# Patient Record
Sex: MALE | Race: WHITE | NOT HISPANIC OR LATINO | Employment: FULL TIME | URBAN - METROPOLITAN AREA
[De-identification: names, ages, dates, MRNs, and addresses within clinical notes are randomized per-mention and may not be internally consistent; named-entity substitution may affect disease eponyms.]

---

## 2020-09-10 ENCOUNTER — OFFICE VISIT (OUTPATIENT)
Dept: FAMILY MEDICINE | Facility: CLINIC | Age: 40
End: 2020-09-10

## 2020-09-10 VITALS
SYSTOLIC BLOOD PRESSURE: 130 MMHG | RESPIRATION RATE: 16 BRPM | HEART RATE: 90 BPM | DIASTOLIC BLOOD PRESSURE: 76 MMHG | BODY MASS INDEX: 28 KG/M2 | WEIGHT: 200 LBS | OXYGEN SATURATION: 97 % | TEMPERATURE: 98.1 F | HEIGHT: 71 IN

## 2020-09-10 DIAGNOSIS — B07.8 OTHER VIRAL WARTS: Primary | ICD-10-CM

## 2020-09-10 DIAGNOSIS — L84 CORN OR CALLUS: ICD-10-CM

## 2020-09-10 PROCEDURE — 17110 DESTRUCTION B9 LES UP TO 14: CPT | Performed by: NURSE PRACTITIONER

## 2020-09-10 PROCEDURE — 99202 OFFICE O/P NEW SF 15 MIN: CPT | Mod: 25 | Performed by: NURSE PRACTITIONER

## 2020-09-10 ASSESSMENT — MIFFLIN-ST. JEOR: SCORE: 1844.32

## 2020-09-10 NOTE — PROGRESS NOTES
"    Problem(s) Oriented visit        SUBJECTIVE:                                                    Sonny Deluca is a 40 year old male who presents to clinic today for the following health issues :    Two warts- one to the bottom of his R foot present x10 years, occasionally bothersome with walking. One to his left calf that is getting a little bigger over time, present for the past year. No OTC treatments at home, would like these removed. Not immunocompromised, not on blood thinners.      Problem list, Medication list, Allergies, and Medical/Social/Surgical histories reviewed in [facility] and updated as appropriate.   Additional history: as documented    ROS:  Gen, skin negative except as listed per HPI    Histories:   Patient Active Problem List   Diagnosis     Health Care Home     Past Surgical History:   Procedure Laterality Date     NO HISTORY OF SURGERY         Social History     Tobacco Use     Smoking status: Never Smoker     Smokeless tobacco: Never Used   Substance Use Topics     Alcohol use: No     Family History   Problem Relation Age of Onset     Alcohol/Drug Father      Depression Mother         medicated     Cancer Paternal Grandfather         stomach     Family History Negative Sister          Current Outpatient Medications   Medication Sig Dispense Refill     calcium carbonate (TUMS) 500 MG chewable tablet Take 1 chew tab by mouth as needed        cetirizine (ZYRTEC) 10 MG tablet Take 10 mg by mouth daily         OBJECTIVE:                                                    /76   Pulse 90   Temp 98.1  F (36.7  C) (Skin)   Resp 16   Ht 1.803 m (5' 11\")   Wt 90.7 kg (200 lb)   SpO2 97%   BMI 27.89 kg/m    Body mass index is 27.89 kg/m .     The patient appears today in no apparent distress.  Vitals as above.  Skin: A non-erythematous, raised verrucous appearing papule with pinpoint hemmorhages measuring roughly 8mm is seen on the left medial calf. Calloused appearing lesion with a central " core present to the pad of the R foot.    PROCEDURE:  Debridement of corn  Debridement and cryotherapy of wart    After discussion of risks, benefits, indications, and treatment options, Sonny elects for debridement and cryotherapy. He gave verbal consent for treatment.    #1) Corn: Lesion debrided with #15 blade and central core removed. Bandage applied. He tolerated this well.    #2) Wart: Lesion debrided with #15 blade and was treated with liquid nitrogen in a 30 second freeze/60 second thaw cycle with a 2mm margin. Bandage applied, tolerated well.       ASSESSMENT/PLAN:                                                        Sonny was seen today for wart.    Diagnoses and all orders for this visit:    Other viral warts  -     DESTRUCT BENIGN LESION, UP TO 14    Discussed viral nature of warts, treatment with cryotherapy and Compound W. To apply Compound W nightly and occlude with a bandage, return in two weeks for another round of cryotherapy. Reviewed after care, what to expect, and when to call.    Carlin or callus    Tolerated debridement well, reviewed the pathophysiology of corns. Recommend trying a moleskin pad and having good supportive footwear. Reviewed after care, what to expect, and when to call.    Patient needs assistance with ADLs: none identified today  Patient needs assistance with iADLs: none identified today    The following health maintenance items are reviewed in Epic and correct as of today:  Health Maintenance   Topic Date Due     HIV SCREENING  05/27/1996     PREVENTIVE CARE VISIT  08/21/2014     LIPID  08/21/2018     PHQ-2  01/01/2020     INFLUENZA VACCINE (1) 09/01/2020     DTAP/TDAP/TD IMMUNIZATION (2 - Td) 11/21/2022     IPV IMMUNIZATION  Aged Out     MENINGITIS IMMUNIZATION  Aged Out     HEPATITIS B IMMUNIZATION  Aged Out       Patient Instructions     Patient Education     Treating Corns and Calluses  If your corns or calluses are mild, reducing friction may help. Different shoes,  moleskin patches, or soft pads may be all the treatment you need. In more severe cases, treating tissue buildup may require your doctor s care. Sometimes custom-made shoe inserts (orthotics) or special pads are prescribed to reduce friction and pressure.     Doctor examining senior man's foot.   Change shoes  If you have corns, your doctor may suggest wearing shoes that have more toe room. This way, buckled joints are less likely to be pinched against the top of the shoe. If you have calluses, wearing a cushioned insole, arch support, or heel counter can help reduce friction.  Visit your doctor  In some cases, your doctor may trim away the outer layers of skin that make up the corn or callus. For a painful corn, medicine may be injected beneath the built-up tissue.  Wear orthotics  Orthotics are specially made to meet the needs of your feet. They cushion calluses or divert pressure away from these problem areas. Worn as directed, orthotics help limit existing problems and prevent new ones from forming.  If you need surgery  If a bone or joint is out of place, certain parts of your foot may be under too much pressure. This can cause severe corns and calluses. In such cases, surgery may be the best way to correct the problem.  Outpatient procedures  In most cases, surgery to improve bone position is an outpatient procedure. Your doctor may cut away excess bone, reposition prominent bones, or even fuse joints. Sometimes tendons or ligaments are cut to reduce tension on a bone or joint. Your doctor will talk with you about the procedure that is best suited to your needs.  Date Last Reviewed: 7/1/2016 2000-2019 [employer]. [address] [place] [address], [place] [address]. All rights reserved. This information is not intended as a substitute for professional medical care. Always follow your healthcare professional's instructions.         Patient Education     Treating Warts     You and your healthcare provider  can discuss whether your warts need to be treated.     You and your healthcare provider can talk about what treatment may be best for your wart or warts. To get rid of your warts, your healthcare provider may need to try more than one type of treatment. The methods described below are often used to treat warts.  Types of treatment    Do nothing. Most warts will resolve within 2 years, even without treatment. So doing nothing is sometimes a good option. This is particularly true for smaller warts that are not causing symptoms.    Cryotherapy (liquid nitrogen). This kills skin cells by freezing them. It kills the warts and destroys skin infected by the wart-causing virus. This is done in your healthcare provider s office and will cause some discomfort. It may take several treatments over several weeks to get rid of the warts.    Topical medicines. Prescribed topical medicines can be put on the skin. These are usually applied in the healthcare provider's office. But some prescriptions may be applied at home.    Over-the-counter (OTC) topical treatments. OTC medicines that most often contain salicylic acid may be an option. These patches, liquids, and creams are used at home. The medicine is applied daily to the wart and nearby skin. It's usually left on overnight. The dead skin is filed down the next day. In 1 to 3 days, the procedure can be repeated. Topical treatments are sometimes combined with cryotherapy.    Electrodessication and curettage (ED & C).  For this procedure, the healthcare provider applies numbing medicine to the wart. Then the wart is scraped or cut off. This type of treatment is usually not the first line of therapy.    Laser surgery.  This can vaporize wart tissue or destroy the blood vessels that feed the wart. This is done in the healthcare provider's office.    Shots (injections). These can be used to treat warts that don t respond to other treatments, such as stubborn or painful warts around  the nails. This is done in the healthcare provider s office.    When to seek medical treatment  It s a good idea to have your healthcare provider check your warts. That way your provider can rule out any other skin problems. Sometimes a callous or a corn can look like a wart, but the treatments may differ. Treatment can also provide relief from warts that bleed, burn, hurt, or itch. Genital warts should always be treated. They can spread to other people through sexual contact. And they may cause genital or cervical cancer.  After having your warts treated, new warts may still appear. Don t be discouraged. Warts often come back. See your healthcare provider again to discuss this. Your provider can tell you about the treatments that most likely will help clear your skin of warts.  Date Last Reviewed: 2/1/2017 2000-2019 [employer]. [address] [place] [address]. All rights reserved. This information is not intended as a substitute for professional medical care. Always follow your healthcare professional's instructions.         Use compound W on the wart nightly, come back in two weeks for another round of cryotherapy. Keep the area clean, dry, and covered.      AUDIE Araiza CNP  [facility]  Family Practice  [facility]  [phone]    For any issues my office # is [phone]

## 2020-09-10 NOTE — PATIENT INSTRUCTIONS
Patient Education     Treating Corns and Calluses  If your corns or calluses are mild, reducing friction may help. Different shoes, moleskin patches, or soft pads may be all the treatment you need. In more severe cases, treating tissue buildup may require your doctor s care. Sometimes custom-made shoe inserts (orthotics) or special pads are prescribed to reduce friction and pressure.     Doctor examining senior man's foot.   Change shoes  If you have corns, your doctor may suggest wearing shoes that have more toe room. This way, buckled joints are less likely to be pinched against the top of the shoe. If you have calluses, wearing a cushioned insole, arch support, or heel counter can help reduce friction.  Visit your doctor  In some cases, your doctor may trim away the outer layers of skin that make up the corn or callus. For a painful corn, medicine may be injected beneath the built-up tissue.  Wear orthotics  Orthotics are specially made to meet the needs of your feet. They cushion calluses or divert pressure away from these problem areas. Worn as directed, orthotics help limit existing problems and prevent new ones from forming.  If you need surgery  If a bone or joint is out of place, certain parts of your foot may be under too much pressure. This can cause severe corns and calluses. In such cases, surgery may be the best way to correct the problem.  Outpatient procedures  In most cases, surgery to improve bone position is an outpatient procedure. Your doctor may cut away excess bone, reposition prominent bones, or even fuse joints. Sometimes tendons or ligaments are cut to reduce tension on a bone or joint. Your doctor will talk with you about the procedure that is best suited to your needs.  Date Last Reviewed: 7/1/2016 2000-2019 [employer]. [address] [place] [address], [place] [address]. All rights reserved. This information is not intended as a substitute for professional medical care. Always  follow your healthcare professional's instructions.         Patient Education     Treating Warts     You and your healthcare provider can discuss whether your warts need to be treated.     You and your healthcare provider can talk about what treatment may be best for your wart or warts. To get rid of your warts, your healthcare provider may need to try more than one type of treatment. The methods described below are often used to treat warts.  Types of treatment    Do nothing. Most warts will resolve within 2 years, even without treatment. So doing nothing is sometimes a good option. This is particularly true for smaller warts that are not causing symptoms.    Cryotherapy (liquid nitrogen). This kills skin cells by freezing them. It kills the warts and destroys skin infected by the wart-causing virus. This is done in your healthcare provider s office and will cause some discomfort. It may take several treatments over several weeks to get rid of the warts.    Topical medicines. Prescribed topical medicines can be put on the skin. These are usually applied in the healthcare provider's office. But some prescriptions may be applied at home.    Over-the-counter (OTC) topical treatments. OTC medicines that most often contain salicylic acid may be an option. These patches, liquids, and creams are used at home. The medicine is applied daily to the wart and nearby skin. It's usually left on overnight. The dead skin is filed down the next day. In 1 to 3 days, the procedure can be repeated. Topical treatments are sometimes combined with cryotherapy.    Electrodessication and curettage (ED & C).  For this procedure, the healthcare provider applies numbing medicine to the wart. Then the wart is scraped or cut off. This type of treatment is usually not the first line of therapy.    Laser surgery.  This can vaporize wart tissue or destroy the blood vessels that feed the wart. This is done in the healthcare provider's  office.    Shots (injections). These can be used to treat warts that don t respond to other treatments, such as stubborn or painful warts around the nails. This is done in the healthcare provider s office.    When to seek medical treatment  It s a good idea to have your healthcare provider check your warts. That way your provider can rule out any other skin problems. Sometimes a callous or a corn can look like a wart, but the treatments may differ. Treatment can also provide relief from warts that bleed, burn, hurt, or itch. Genital warts should always be treated. They can spread to other people through sexual contact. And they may cause genital or cervical cancer.  After having your warts treated, new warts may still appear. Don t be discouraged. Warts often come back. See your healthcare provider again to discuss this. Your provider can tell you about the treatments that most likely will help clear your skin of warts.  Date Last Reviewed: 2/1/2017 2000-2019 [employer]. [address] [place] [address], [place] [address]. All rights reserved. This information is not intended as a substitute for professional medical care. Always follow your healthcare professional's instructions.         Use compound W on the wart nightly, come back in two weeks for another round of cryotherapy. Keep the area clean, dry, and covered.

## 2020-09-24 ENCOUNTER — OFFICE VISIT (OUTPATIENT)
Dept: FAMILY MEDICINE | Facility: CLINIC | Age: 40
End: 2020-09-24

## 2020-09-24 VITALS
SYSTOLIC BLOOD PRESSURE: 112 MMHG | OXYGEN SATURATION: 97 % | HEART RATE: 94 BPM | TEMPERATURE: 98.1 F | BODY MASS INDEX: 27.7 KG/M2 | WEIGHT: 198.6 LBS | DIASTOLIC BLOOD PRESSURE: 70 MMHG

## 2020-09-24 DIAGNOSIS — B07.9 VIRAL WARTS, UNSPECIFIED TYPE: Primary | ICD-10-CM

## 2020-09-24 PROCEDURE — 99207 ZZC NO CHARGE LOS: CPT | Performed by: NURSE PRACTITIONER

## 2020-09-24 NOTE — PROGRESS NOTES
Problem(s) Oriented visit        SUBJECTIVE:                                                    Sonny Deluca is a 40 year old male who presents to clinic today for the following health issues :    Cryotherapy to wart two weeks ago to his left medial calf- presents today for second round of cryotherapy. However, he has concerns that his lesion has not yet healed and expresses frustration with the healing process. He is applying Compound W nightly and applying a bandage. He is concerned that his lesion will leave a big scar and that his cryotherapy treatment was much larger than necessary. He reports the area blistered, which he was prepared for, but then bled after the blister drained.      OBJECTIVE:                                                    Physical Exam:    /70   Pulse 94   Temp 98.1  F (36.7  C)   Wt 90.1 kg (198 lb 9.6 oz)   SpO2 97%   BMI 27.70 kg/m      CONSTITUTIONAL: Alert non-toxic appearing male in no acute distress  SKIN: L medial calf with roughly 1cm flat lesion, mildly erythematous borders, base with granulating tissue. Does appear slightly macerated.  PSYCHIATRIC: Interactive, affect euthymic, makes appropriate eye contact, thought process logical       ASSESSMENT/PLAN:                                                        Sonny was seen today for recheck.    Diagnoses and all orders for this visit:    Viral warts, unspecified type      Lesion treated does not appear to have healed yet at this point- does not appear infected, but appears macerated. Reviewed that a 2mm margin was obtained and the lesion seems consistent with this- he is concerned that he will have scarring. Discussed that he may have some scarring, but that his lesion has not yet healed. He does seem frustrated with this. Since lesion appears macerated, to stop Compound W. Keep area clean and dry. Allow the lesion to heal over the next 1-2 weeks, and if he still has evidence of remaining wart, can retrial  Compound W vs cryotherapy depending on his preference and tolerance. I have encouraged him to send me pictures of his progress via [employer].     Given that this was to a be a procedure visit and procedure was not performed, there will be no charge for this visit.     AUDIE Araiza CNP  [facility]  [phone]    For any issues my office # is [phone]

## 2020-09-24 NOTE — PATIENT INSTRUCTIONS
Stop compound W and use a bandage over the site, change daily- if still present in two weeks, send Tonya a message

## 2023-02-13 ENCOUNTER — HOSPITAL ENCOUNTER (EMERGENCY)
Facility: CLINIC | Age: 43
Discharge: LEFT BEFORE DISCHARGE | End: 2023-02-13
Payer: COMMERCIAL

## 2023-02-13 ENCOUNTER — OFFICE VISIT (OUTPATIENT)
Dept: URGENT CARE | Facility: URGENT CARE | Age: 43
End: 2023-02-13
Payer: COMMERCIAL

## 2023-02-13 VITALS
TEMPERATURE: 98.1 F | HEART RATE: 93 BPM | WEIGHT: 222 LBS | DIASTOLIC BLOOD PRESSURE: 74 MMHG | SYSTOLIC BLOOD PRESSURE: 114 MMHG | RESPIRATION RATE: 16 BRPM | BODY MASS INDEX: 30.96 KG/M2 | OXYGEN SATURATION: 97 %

## 2023-02-13 DIAGNOSIS — R10.32 ABDOMINAL PAIN, LEFT LOWER QUADRANT: Primary | ICD-10-CM

## 2023-02-13 LAB
ALBUMIN UR-MCNC: 100 MG/DL
AMORPH CRY #/AREA URNS HPF: ABNORMAL /HPF
APPEARANCE UR: CLEAR
BACTERIA #/AREA URNS HPF: ABNORMAL /HPF
BASOPHILS # BLD AUTO: 0 10E3/UL (ref 0–0.2)
BASOPHILS NFR BLD AUTO: 0 %
BILIRUB UR QL STRIP: ABNORMAL
COLOR UR AUTO: ABNORMAL
EOSINOPHIL # BLD AUTO: 0.1 10E3/UL (ref 0–0.7)
EOSINOPHIL NFR BLD AUTO: 1 %
ERYTHROCYTE [DISTWIDTH] IN BLOOD BY AUTOMATED COUNT: 12.8 % (ref 10–15)
GLUCOSE UR STRIP-MCNC: NEGATIVE MG/DL
HCT VFR BLD AUTO: 42.9 % (ref 40–53)
HGB BLD-MCNC: 14.8 G/DL (ref 13.3–17.7)
HGB UR QL STRIP: NEGATIVE
IMM GRANULOCYTES # BLD: 0 10E3/UL
IMM GRANULOCYTES NFR BLD: 0 %
KETONES UR STRIP-MCNC: 15 MG/DL
LEUKOCYTE ESTERASE UR QL STRIP: NEGATIVE
LYMPHOCYTES # BLD AUTO: 1.3 10E3/UL (ref 0.8–5.3)
LYMPHOCYTES NFR BLD AUTO: 13 %
MCH RBC QN AUTO: 29.4 PG (ref 26.5–33)
MCHC RBC AUTO-ENTMCNC: 34.5 G/DL (ref 31.5–36.5)
MCV RBC AUTO: 85 FL (ref 78–100)
MONOCYTES # BLD AUTO: 0.8 10E3/UL (ref 0–1.3)
MONOCYTES NFR BLD AUTO: 8 %
MUCOUS THREADS #/AREA URNS LPF: PRESENT /LPF
NEUTROPHILS # BLD AUTO: 7.8 10E3/UL (ref 1.6–8.3)
NEUTROPHILS NFR BLD AUTO: 78 %
NITRATE UR QL: POSITIVE
PH UR STRIP: 5.5 [PH] (ref 5–7)
PLATELET # BLD AUTO: 140 10E3/UL (ref 150–450)
RBC # BLD AUTO: 5.03 10E6/UL (ref 4.4–5.9)
RBC #/AREA URNS AUTO: ABNORMAL /HPF
SP GR UR STRIP: >=1.03 (ref 1–1.03)
SQUAMOUS #/AREA URNS AUTO: ABNORMAL /LPF
UROBILINOGEN UR STRIP-ACNC: 1 E.U./DL
WBC # BLD AUTO: 10 10E3/UL (ref 4–11)
WBC #/AREA URNS AUTO: ABNORMAL /HPF

## 2023-02-13 PROCEDURE — 36415 COLL VENOUS BLD VENIPUNCTURE: CPT

## 2023-02-13 PROCEDURE — 99214 OFFICE O/P EST MOD 30 MIN: CPT

## 2023-02-13 PROCEDURE — 87086 URINE CULTURE/COLONY COUNT: CPT | Performed by: FAMILY MEDICINE

## 2023-02-13 PROCEDURE — 85025 COMPLETE CBC W/AUTO DIFF WBC: CPT

## 2023-02-13 PROCEDURE — 81001 URINALYSIS AUTO W/SCOPE: CPT

## 2023-02-13 ASSESSMENT — ACTIVITIES OF DAILY LIVING (ADL): ADLS_ACUITY_SCORE: 33

## 2023-02-13 NOTE — ED NOTES
Patient called in triage x3 at 1453, 1458, and 1503. No answer, patient did not inform staff he was leaving.

## 2023-02-13 NOTE — PROGRESS NOTES
Assessment & Plan     Abdominal pain, left lower quadrant  - UA with Microscopic reflex to Culture - Clinic Collect  - appears contaminated  - CBC with platelets and differential - unremarkable with WBC 10.0.  - Urine Microscopic Exam  - Urine Culture  - Concern for diverticulitis in setting of marked LLQ tenderness on exam and fevers - referred to ED for CT.     Ade Almanza [facility]-FNP Student  Lynne Ring MD   Urgent Care Provider  [facility] CARMELINA Dennis is a 42 year old presenting for the following health issues:  Urgent Care and Abdominal Pain (Per patient has been having LLQ abdominal pain and fever for the past 24hrs. Patient has been taking ibuprofen for symptoms )      HPI   Without significant past medical history. Reports remote history of kidney stone. No hx of abdominal surgeries. Presents with 36 - 48 hours fevers/chills - tmax 100.3 Saturday night, feeling generally unwell, sharp pain LLQ. Note slight dry cough. No BRBPR or dark tarry stools. Denies N/V/D/C. Taking ibuprofen for pain - has taken maybe 3-4 doses in the past 48 hours - this does help. Not on any medications regularly.       Review of Systems   Constitutional, HEENT, cardiovascular, pulmonary, gi and gu systems are negative, except as otherwise noted.      Objective    /74   Pulse 93   Temp 98.1  F (36.7  C) (Tympanic)   Resp 16   Wt 100.7 kg (222 lb)   SpO2 97%   BMI 30.96 kg/m    Body mass index is 30.96 kg/m .  Physical Exam   GENERAL: healthy, alert and no distress  EYES: Eyes grossly normal to inspection, PERRL and conjunctivae and sclerae normal  NECK: no adenopathy, no asymmetry, masses, or scars and thyroid normal to palpation  RESP: lungs clear to auscultation - no rales, rhonchi or wheezes  CV: regular rate and rhythm, normal S1 S2, no S3 or S4, no murmur, click or rub, no peripheral edema and peripheral pulses strong  ABDOMEN: tenderness upon palpation and rebound  tenderness LLQ, bowel sounds normal, no palpable or pulsatile masses and no scars, striae, dilated veins, rashes, or lesions.  SKIN: no suspicious lesions or rashes  NEURO: Normal strength and tone, mentation intact and speech normal  PSYCH: mentation appears normal, affect normal/bright    Results for orders placed or performed in visit on 02/13/23 (from the past 24 hour(s))   UA with Microscopic reflex to Culture - Clinic Collect    Specimen: Urine, Midstream   Result Value Ref Range    Color Urine Red (A) Colorless, Straw, Light Yellow, Yellow    Appearance Urine Clear Clear    Glucose Urine Negative Negative mg/dL    Bilirubin Urine Moderate (A) Negative    Ketones Urine 15 (A) Negative mg/dL    Specific Gravity Urine >=1.030 1.003 - 1.035    Blood Urine Negative Negative    pH Urine 5.5 5.0 - 7.0    Protein Albumin Urine 100 (A) Negative mg/dL    Urobilinogen Urine 1.0 0.2, 1.0 E.U./dL    Nitrite Urine Positive (A) Negative    Leukocyte Esterase Urine Negative Negative   CBC with platelets and differential    Narrative    The following orders were created for panel order CBC with platelets and differential.  Procedure                               Abnormality         Status                     ---------                               -----------         ------                     CBC with platelets and d...[[id number]]  Abnormal            Final result                 Please view results for these tests on the individual orders.   CBC with platelets and differential   Result Value Ref Range    WBC Count 10.0 4.0 - 11.0 10e3/uL    RBC Count 5.03 4.40 - 5.90 10e6/uL    Hemoglobin 14.8 13.3 - 17.7 g/dL    Hematocrit 42.9 40.0 - 53.0 %    MCV 85 78 - 100 fL    MCH 29.4 26.5 - 33.0 pg    MCHC 34.5 31.5 - 36.5 g/dL    RDW 12.8 10.0 - 15.0 %    Platelet Count 140 (L) 150 - 450 10e3/uL    % Neutrophils 78 %    % Lymphocytes 13 %    % Monocytes 8 %    % Eosinophils 1 %    % Basophils 0 %    % Immature Granulocytes 0 %     Absolute Neutrophils 7.8 1.6 - 8.3 10e3/uL    Absolute Lymphocytes 1.3 0.8 - 5.3 10e3/uL    Absolute Monocytes 0.8 0.0 - 1.3 10e3/uL    Absolute Eosinophils 0.1 0.0 - 0.7 10e3/uL    Absolute Basophils 0.0 0.0 - 0.2 10e3/uL    Absolute Immature Granulocytes 0.0 <=0.4 10e3/uL   Urine Microscopic Exam   Result Value Ref Range    Bacteria Urine Few (A) None Seen /HPF    RBC Urine 0-2 0-2 /HPF /HPF    WBC Urine 0-5 0-5 /HPF /HPF    Squamous Epithelials Urine Few (A) None Seen /LPF    Mucus Urine Present (A) None Seen /LPF    Amorphous Crystals Urine Few (A) None Seen /HPF

## 2023-02-14 ENCOUNTER — APPOINTMENT (OUTPATIENT)
Dept: CT IMAGING | Facility: CLINIC | Age: 43
DRG: 391 | End: 2023-02-14
Attending: EMERGENCY MEDICINE
Payer: COMMERCIAL

## 2023-02-14 ENCOUNTER — APPOINTMENT (OUTPATIENT)
Dept: GENERAL RADIOLOGY | Facility: CLINIC | Age: 43
DRG: 391 | End: 2023-02-14
Attending: INTERNAL MEDICINE
Payer: COMMERCIAL

## 2023-02-14 ENCOUNTER — APPOINTMENT (OUTPATIENT)
Dept: MRI IMAGING | Facility: CLINIC | Age: 43
DRG: 391 | End: 2023-02-14
Attending: INTERNAL MEDICINE
Payer: COMMERCIAL

## 2023-02-14 ENCOUNTER — HOSPITAL ENCOUNTER (INPATIENT)
Facility: CLINIC | Age: 43
LOS: 2 days | Discharge: HOME | DRG: 391 | End: 2023-02-16
Attending: EMERGENCY MEDICINE | Admitting: INTERNAL MEDICINE
Payer: COMMERCIAL

## 2023-02-14 DIAGNOSIS — K57.32 DIVERTICULITIS OF COLON: ICD-10-CM

## 2023-02-14 DIAGNOSIS — K63.1 COLON PERFORATION (H): ICD-10-CM

## 2023-02-14 DIAGNOSIS — Z76.89 HEALTH CARE HOME: Primary | ICD-10-CM

## 2023-02-14 LAB
ALBUMIN SERPL BCG-MCNC: 4.4 G/DL (ref 3.5–5.2)
ALP SERPL-CCNC: 100 U/L (ref 40–129)
ALT SERPL W P-5'-P-CCNC: 53 U/L (ref 10–50)
ANION GAP SERPL CALCULATED.3IONS-SCNC: 11 MMOL/L (ref 7–15)
AST SERPL W P-5'-P-CCNC: 40 U/L (ref 10–50)
BASOPHILS # BLD AUTO: 0 10E3/UL (ref 0–0.2)
BASOPHILS NFR BLD AUTO: 0 %
BILIRUB SERPL-MCNC: 1.3 MG/DL
BUN SERPL-MCNC: 13.4 MG/DL (ref 6–20)
CALCIUM SERPL-MCNC: 9.1 MG/DL (ref 8.6–10)
CHLORIDE SERPL-SCNC: 101 MMOL/L (ref 98–107)
CREAT SERPL-MCNC: 0.99 MG/DL (ref 0.67–1.17)
DEPRECATED HCO3 PLAS-SCNC: 26 MMOL/L (ref 22–29)
EOSINOPHIL # BLD AUTO: 0.1 10E3/UL (ref 0–0.7)
EOSINOPHIL NFR BLD AUTO: 2 %
ERYTHROCYTE [DISTWIDTH] IN BLOOD BY AUTOMATED COUNT: 12.7 % (ref 10–15)
FLUAV RNA SPEC QL NAA+PROBE: NEGATIVE
FLUBV RNA RESP QL NAA+PROBE: NEGATIVE
GFR SERPL CREATININE-BSD FRML MDRD: >90 ML/MIN/1.73M2
GLUCOSE SERPL-MCNC: 107 MG/DL (ref 70–99)
HCT VFR BLD AUTO: 43.5 % (ref 40–53)
HGB BLD-MCNC: 14.8 G/DL (ref 13.3–17.7)
HOLD SPECIMEN: NORMAL
IMM GRANULOCYTES # BLD: 0 10E3/UL
IMM GRANULOCYTES NFR BLD: 0 %
LIPASE SERPL-CCNC: 16 U/L (ref 13–60)
LYMPHOCYTES # BLD AUTO: 0.7 10E3/UL (ref 0.8–5.3)
LYMPHOCYTES NFR BLD AUTO: 10 %
MAGNESIUM SERPL-MCNC: 2 MG/DL (ref 1.7–2.3)
MCH RBC QN AUTO: 29 PG (ref 26.5–33)
MCHC RBC AUTO-ENTMCNC: 34 G/DL (ref 31.5–36.5)
MCV RBC AUTO: 85 FL (ref 78–100)
MONOCYTES # BLD AUTO: 0.5 10E3/UL (ref 0–1.3)
MONOCYTES NFR BLD AUTO: 8 %
NEUTROPHILS # BLD AUTO: 5.3 10E3/UL (ref 1.6–8.3)
NEUTROPHILS NFR BLD AUTO: 80 %
NRBC # BLD AUTO: 0 10E3/UL
NRBC BLD AUTO-RTO: 0 /100
PHOSPHATE SERPL-MCNC: 2.6 MG/DL (ref 2.5–4.5)
PLATELET # BLD AUTO: 141 10E3/UL (ref 150–450)
POTASSIUM SERPL-SCNC: 3.8 MMOL/L (ref 3.4–5.3)
PROT SERPL-MCNC: 7.6 G/DL (ref 6.4–8.3)
RBC # BLD AUTO: 5.1 10E6/UL (ref 4.4–5.9)
RSV RNA SPEC NAA+PROBE: NEGATIVE
SARS-COV-2 RNA RESP QL NAA+PROBE: POSITIVE
SODIUM SERPL-SCNC: 138 MMOL/L (ref 136–145)
WBC # BLD AUTO: 6.6 10E3/UL (ref 4–11)

## 2023-02-14 PROCEDURE — 84100 ASSAY OF PHOSPHORUS: CPT | Performed by: INTERNAL MEDICINE

## 2023-02-14 PROCEDURE — 80053 COMPREHEN METABOLIC PANEL: CPT | Performed by: EMERGENCY MEDICINE

## 2023-02-14 PROCEDURE — 85025 COMPLETE CBC W/AUTO DIFF WBC: CPT | Performed by: EMERGENCY MEDICINE

## 2023-02-14 PROCEDURE — 258N000003 HC RX IP 258 OP 636: Performed by: INTERNAL MEDICINE

## 2023-02-14 PROCEDURE — 96374 THER/PROPH/DIAG INJ IV PUSH: CPT

## 2023-02-14 PROCEDURE — 36415 COLL VENOUS BLD VENIPUNCTURE: CPT | Performed by: EMERGENCY MEDICINE

## 2023-02-14 PROCEDURE — XW033E5 INTRODUCTION OF REMDESIVIR ANTI-INFECTIVE INTO PERIPHERAL VEIN, PERCUTANEOUS APPROACH, NEW TECHNOLOGY GROUP 5: ICD-10-PCS | Performed by: INTERNAL MEDICINE

## 2023-02-14 PROCEDURE — 99285 EMERGENCY DEPT VISIT HI MDM: CPT | Mod: 25,CS

## 2023-02-14 PROCEDURE — 250N000013 HC RX MED GY IP 250 OP 250 PS 637: Performed by: INTERNAL MEDICINE

## 2023-02-14 PROCEDURE — 250N000011 HC RX IP 250 OP 636: Performed by: INTERNAL MEDICINE

## 2023-02-14 PROCEDURE — C9803 HOPD COVID-19 SPEC COLLECT: HCPCS

## 2023-02-14 PROCEDURE — 250N000009 HC RX 250: Performed by: EMERGENCY MEDICINE

## 2023-02-14 PROCEDURE — 83735 ASSAY OF MAGNESIUM: CPT | Performed by: INTERNAL MEDICINE

## 2023-02-14 PROCEDURE — 83690 ASSAY OF LIPASE: CPT | Performed by: EMERGENCY MEDICINE

## 2023-02-14 PROCEDURE — 74177 CT ABD & PELVIS W/CONTRAST: CPT

## 2023-02-14 PROCEDURE — 255N000002 HC RX 255 OP 636: Performed by: EMERGENCY MEDICINE

## 2023-02-14 PROCEDURE — 250N000011 HC RX IP 250 OP 636: Performed by: EMERGENCY MEDICINE

## 2023-02-14 PROCEDURE — 86663 EPSTEIN-BARR ANTIBODY: CPT | Performed by: INTERNAL MEDICINE

## 2023-02-14 PROCEDURE — 99223 1ST HOSP IP/OBS HIGH 75: CPT | Mod: AI | Performed by: INTERNAL MEDICINE

## 2023-02-14 PROCEDURE — 250N000009 HC RX 250: Performed by: INTERNAL MEDICINE

## 2023-02-14 PROCEDURE — 120N000001 HC R&B MED SURG/OB

## 2023-02-14 PROCEDURE — 71045 X-RAY EXAM CHEST 1 VIEW: CPT

## 2023-02-14 PROCEDURE — 250N000013 HC RX MED GY IP 250 OP 250 PS 637: Performed by: EMERGENCY MEDICINE

## 2023-02-14 PROCEDURE — 87637 SARSCOV2&INF A&B&RSV AMP PRB: CPT | Performed by: EMERGENCY MEDICINE

## 2023-02-14 PROCEDURE — A9585 GADOBUTROL INJECTION: HCPCS | Performed by: EMERGENCY MEDICINE

## 2023-02-14 PROCEDURE — 86644 CMV ANTIBODY: CPT | Performed by: INTERNAL MEDICINE

## 2023-02-14 PROCEDURE — 74183 MRI ABD W/O CNTR FLWD CNTR: CPT

## 2023-02-14 PROCEDURE — 258N000003 HC RX IP 258 OP 636: Performed by: EMERGENCY MEDICINE

## 2023-02-14 RX ORDER — GABAPENTIN 300 MG/1
600 CAPSULE ORAL EVERY 8 HOURS
Status: DISCONTINUED | OUTPATIENT
Start: 2023-02-17 | End: 2023-02-15

## 2023-02-14 RX ORDER — GABAPENTIN 300 MG/1
300 CAPSULE ORAL EVERY 8 HOURS
Status: DISCONTINUED | OUTPATIENT
Start: 2023-02-19 | End: 2023-02-15

## 2023-02-14 RX ORDER — ONDANSETRON 2 MG/ML
4 INJECTION INTRAMUSCULAR; INTRAVENOUS EVERY 6 HOURS PRN
Status: DISCONTINUED | OUTPATIENT
Start: 2023-02-14 | End: 2023-02-16 | Stop reason: HOSPADM

## 2023-02-14 RX ORDER — PIPERACILLIN SODIUM, TAZOBACTAM SODIUM 3; .375 G/15ML; G/15ML
3.38 INJECTION, POWDER, LYOPHILIZED, FOR SOLUTION INTRAVENOUS ONCE
Status: COMPLETED | OUTPATIENT
Start: 2023-02-14 | End: 2023-02-14

## 2023-02-14 RX ORDER — DIAZEPAM 5 MG
10 TABLET ORAL EVERY 30 MIN PRN
Status: DISCONTINUED | OUTPATIENT
Start: 2023-02-14 | End: 2023-02-16 | Stop reason: HOSPADM

## 2023-02-14 RX ORDER — FOLIC ACID 1 MG/1
1 TABLET ORAL DAILY
Status: DISCONTINUED | OUTPATIENT
Start: 2023-02-15 | End: 2023-02-16 | Stop reason: HOSPADM

## 2023-02-14 RX ORDER — FLUMAZENIL 0.1 MG/ML
0.2 INJECTION, SOLUTION INTRAVENOUS
Status: DISCONTINUED | OUTPATIENT
Start: 2023-02-14 | End: 2023-02-16 | Stop reason: HOSPADM

## 2023-02-14 RX ORDER — CLONIDINE HYDROCHLORIDE 0.1 MG/1
0.1 TABLET ORAL EVERY 8 HOURS
Status: DISCONTINUED | OUTPATIENT
Start: 2023-02-14 | End: 2023-02-15

## 2023-02-14 RX ORDER — HYDROMORPHONE HCL IN WATER/PF 6 MG/30 ML
0.4 PATIENT CONTROLLED ANALGESIA SYRINGE INTRAVENOUS
Status: DISCONTINUED | OUTPATIENT
Start: 2023-02-14 | End: 2023-02-16 | Stop reason: HOSPADM

## 2023-02-14 RX ORDER — PIPERACILLIN SODIUM, TAZOBACTAM SODIUM 3; .375 G/15ML; G/15ML
3.38 INJECTION, POWDER, LYOPHILIZED, FOR SOLUTION INTRAVENOUS EVERY 6 HOURS
Status: DISCONTINUED | OUTPATIENT
Start: 2023-02-14 | End: 2023-02-16 | Stop reason: HOSPADM

## 2023-02-14 RX ORDER — IOPAMIDOL 755 MG/ML
111 INJECTION, SOLUTION INTRAVASCULAR ONCE
Status: COMPLETED | OUTPATIENT
Start: 2023-02-14 | End: 2023-02-14

## 2023-02-14 RX ORDER — GABAPENTIN 600 MG/1
1200 TABLET ORAL ONCE
Status: COMPLETED | OUTPATIENT
Start: 2023-02-14 | End: 2023-02-14

## 2023-02-14 RX ORDER — GABAPENTIN 100 MG/1
100 CAPSULE ORAL EVERY 8 HOURS
Status: DISCONTINUED | OUTPATIENT
Start: 2023-02-21 | End: 2023-02-15

## 2023-02-14 RX ORDER — MULTIPLE VITAMINS W/ MINERALS TAB 9MG-400MCG
1 TAB ORAL DAILY
Status: DISCONTINUED | OUTPATIENT
Start: 2023-02-15 | End: 2023-02-16 | Stop reason: HOSPADM

## 2023-02-14 RX ORDER — HALOPERIDOL 5 MG/ML
2.5-5 INJECTION INTRAMUSCULAR EVERY 6 HOURS PRN
Status: DISCONTINUED | OUTPATIENT
Start: 2023-02-14 | End: 2023-02-15

## 2023-02-14 RX ORDER — LIDOCAINE 40 MG/G
CREAM TOPICAL
Status: DISCONTINUED | OUTPATIENT
Start: 2023-02-14 | End: 2023-02-16 | Stop reason: HOSPADM

## 2023-02-14 RX ORDER — ACETAMINOPHEN 500 MG
1000 TABLET ORAL ONCE
Status: COMPLETED | OUTPATIENT
Start: 2023-02-14 | End: 2023-02-14

## 2023-02-14 RX ORDER — GADOBUTROL 604.72 MG/ML
10 INJECTION INTRAVENOUS ONCE
Status: COMPLETED | OUTPATIENT
Start: 2023-02-14 | End: 2023-02-14

## 2023-02-14 RX ORDER — ONDANSETRON 4 MG/1
4 TABLET, ORALLY DISINTEGRATING ORAL EVERY 6 HOURS PRN
Status: DISCONTINUED | OUTPATIENT
Start: 2023-02-14 | End: 2023-02-16 | Stop reason: HOSPADM

## 2023-02-14 RX ORDER — OLANZAPINE 5 MG/1
5-10 TABLET, ORALLY DISINTEGRATING ORAL EVERY 6 HOURS PRN
Status: DISCONTINUED | OUTPATIENT
Start: 2023-02-14 | End: 2023-02-15

## 2023-02-14 RX ORDER — GABAPENTIN 300 MG/1
900 CAPSULE ORAL EVERY 8 HOURS
Status: DISCONTINUED | OUTPATIENT
Start: 2023-02-14 | End: 2023-02-15

## 2023-02-14 RX ORDER — SODIUM CHLORIDE 9 MG/ML
INJECTION, SOLUTION INTRAVENOUS CONTINUOUS
Status: DISCONTINUED | OUTPATIENT
Start: 2023-02-14 | End: 2023-02-16 | Stop reason: HOSPADM

## 2023-02-14 RX ORDER — HYDROMORPHONE HCL IN WATER/PF 6 MG/30 ML
0.2 PATIENT CONTROLLED ANALGESIA SYRINGE INTRAVENOUS
Status: DISCONTINUED | OUTPATIENT
Start: 2023-02-14 | End: 2023-02-16 | Stop reason: HOSPADM

## 2023-02-14 RX ORDER — DIAZEPAM 10 MG/2ML
5-10 INJECTION, SOLUTION INTRAMUSCULAR; INTRAVENOUS EVERY 30 MIN PRN
Status: DISCONTINUED | OUTPATIENT
Start: 2023-02-14 | End: 2023-02-16 | Stop reason: HOSPADM

## 2023-02-14 RX ADMIN — REMDESIVIR 200 MG: 100 INJECTION, POWDER, LYOPHILIZED, FOR SOLUTION INTRAVENOUS at 12:15

## 2023-02-14 RX ADMIN — ACETAMINOPHEN 1000 MG: 500 TABLET ORAL at 16:26

## 2023-02-14 RX ADMIN — PIPERACILLIN AND TAZOBACTAM 3.38 G: 3; .375 INJECTION, POWDER, FOR SOLUTION INTRAVENOUS at 09:21

## 2023-02-14 RX ADMIN — SODIUM CHLORIDE, POTASSIUM CHLORIDE, SODIUM LACTATE AND CALCIUM CHLORIDE 1000 ML: 600; 310; 30; 20 INJECTION, SOLUTION INTRAVENOUS at 09:54

## 2023-02-14 RX ADMIN — MELATONIN 5 MG TABLET 5 MG: at 22:56

## 2023-02-14 RX ADMIN — SODIUM CHLORIDE 72 ML: 9 INJECTION, SOLUTION INTRAVENOUS at 08:07

## 2023-02-14 RX ADMIN — GADOBUTROL 10 ML: 604.72 INJECTION INTRAVENOUS at 12:55

## 2023-02-14 RX ADMIN — IOPAMIDOL 111 ML: 755 INJECTION, SOLUTION INTRAVENOUS at 08:07

## 2023-02-14 RX ADMIN — SODIUM CHLORIDE: 9 INJECTION, SOLUTION INTRAVENOUS at 12:21

## 2023-02-14 RX ADMIN — SODIUM CHLORIDE 50 ML: 9 INJECTION, SOLUTION INTRAVENOUS at 12:20

## 2023-02-14 RX ADMIN — CLONIDINE HYDROCHLORIDE 0.1 MG: 0.1 TABLET ORAL at 14:34

## 2023-02-14 RX ADMIN — GABAPENTIN 900 MG: 300 CAPSULE ORAL at 21:45

## 2023-02-14 RX ADMIN — FOLIC ACID: 5 INJECTION, SOLUTION INTRAMUSCULAR; INTRAVENOUS; SUBCUTANEOUS at 18:43

## 2023-02-14 RX ADMIN — PIPERACILLIN AND TAZOBACTAM 3.38 G: 3; .375 INJECTION, POWDER, FOR SOLUTION INTRAVENOUS at 14:38

## 2023-02-14 RX ADMIN — PIPERACILLIN AND TAZOBACTAM 3.38 G: 3; .375 INJECTION, POWDER, FOR SOLUTION INTRAVENOUS at 21:42

## 2023-02-14 RX ADMIN — GABAPENTIN 1200 MG: 600 TABLET, FILM COATED ORAL at 14:34

## 2023-02-14 ASSESSMENT — ACTIVITIES OF DAILY LIVING (ADL)
ADLS_ACUITY_SCORE: 35

## 2023-02-14 NOTE — ED NOTES
[facility]  ED Nurse Handoff Report    ED Chief complaint: Abdominal Pain and Flu Symptoms      ED Diagnosis:   Final diagnoses:   Diverticulitis of colon   Colon perforation (H)       Code Status: Full Code    Allergies: No Known Allergies    Patient Story: Patient comes in with complaints of fevers, body aches and abdominal pain since Saturday. Denies blood in stool. Denies N/V/D. States pain is in lower left abdomen and is stabbing. Seen at  yesterday and told he had diverticulitis  Focused Assessment:  abd pain     Treatments and/or interventions provided: iv , antibiotics, labs, ct   Patient's response to treatments and/or interventions: tolerated     To be done/followed up on inpatient unit:  na     Does this patient have any cognitive concerns?:  nonw    Activity level - Baseline/Home:  Independent  Activity Level - Current:   Independent    Patient's Preferred language: English   Needed?: No    Isolation: Special precautions covid +  Infection: Special precautions covid +  Patient tested for COVID 19 prior to admission: YES  Bariatric?: No    Vital Signs:   Vitals:    02/15/23 0005 02/15/23 0035 02/15/23 0200 02/15/23 0359   BP: 110/65  105/61 109/63   Pulse:   86 82   Resp:       Temp:       TempSrc:       SpO2: 96% 98% 97% 96%   Weight:       Height:           Cardiac Rhythm:     Was the PSS-3 completed:   Yes  What interventions are required if any?               Family Comments:   OBS brochure/video discussed/provided to patient/family: N/A              Name of person given brochure if not patient:               Relationship to patient:     For the majority of the shift this patient's behavior was Green.   Behavioral interventions performed were .    ED NURSE PHONE NUMBER: [phone]

## 2023-02-14 NOTE — PHARMACY-ADMISSION MEDICATION HISTORY
Infant placed back under radiant warmer with control temp at 35.7 with ISC probe to right abd. To warm up after bath. Pharmacy Medication History  Admission medication history interview status for the 2/14/2023  admission is complete. See EPIC admission navigator for prior to admission medications     Location of Interview: Phone  Medication history sources: Patient    Significant changes made to the medication list:    In the past week, patient estimated taking medication this percent of the time:      Additional medication history information:     Medication reconciliation completed by provider prior to medication history? No    Time spent in this activity: 3 min    Prior to Admission medications    Not on File       The information provided in this note is only as accurate as the sources available at the time of update(s)

## 2023-02-14 NOTE — ED PROVIDER NOTES
"  History     Chief Complaint:  Abdominal Pain and Flu Symptoms       HPI   Sonny Deluca is a 42 year old male with no significant past medical history who presents to the ED via/accompanied by self with a chief complaint of needing, moderate to severe sharp left lower quadrant abdominal pain onset approximately 2 days ago with associated chills, night sweats.  Patient reports that he went to urgent care and referred to the emergency department due to concern for diverticulitis.    Independent Historian: Patient provides a history    Review of External Notes: See MDM    ROS:  Review of Systems  Full ROS completed and negative other than pertinent positives and negatives noted in HPI    Allergies:  No Known Allergies     Medications:    calcium carbonate (TUMS) 500 MG chewable tablet  cetirizine (ZYRTEC) 10 MG tablet        Past Medical History:    Past Medical History:   Diagnosis Date     NO ACTIVE PROBLEMS        Past Surgical History:    Past Surgical History:   Procedure Laterality Date     NO HISTORY OF SURGERY          Family History:    family history includes Alcohol/Drug in his father; Cancer in his paternal grandfather; Depression in his mother; Family History Negative in his sister.    Social History:   reports that he has never smoked. He has never used smokeless tobacco. He reports that he does not drink alcohol and does not use drugs.  PCP: Tonya Paredes     Physical Exam     Patient Vitals for the past 24 hrs:   BP Temp Temp src Pulse Resp SpO2 Height Weight   02/14/23 0559 (!) 142/85 99.7  F (37.6  C) Oral 98 18 95 % 1.778 m (5' 10\") 99.8 kg (220 lb)   02/14/23 0558 -- 99.7  F (37.6  C) Oral -- -- -- -- --        Physical Exam  Constitutional: Well developed, forlorn, nontox appearance  Head: Atraumatic.   Mouth/Throat: Oropharynx is clear and moist.   Neck:  no stridor  Eyes: no scleral icterus  Cardiovascular: RRR, 2+ bilat radial pulses  Pulmonary/Chest: nml resp effort, Clear BS " bilat  Abdominal: ND, soft, LLQ abd pain, no rebound or guarding   Ext: Warm, well perfused, no edema  Neurological: A&O, symmetric facies, moves ext x4  Skin: Skin is warm and dry.   Psychiatric: Behavior is normal. Thought content normal.   Nursing note and vitals reviewed.    Emergency Department Course   ECG:  No results found for this or any previous visit.    Imaging:  CT Abdomen Pelvis w Contrast   Final Result   IMPRESSION:    1.  Acute sigmoid diverticulitis with microperforation, with a few   punctate foci of free air adjacent to the inflamed sigmoid colon. No   free fluid or abscess.   2.  Splenomegaly with the spleen measuring about 25 cm in length.   Large simple cyst in the spleen measuring about 19 cm with mild   peripheral wall calcifications. Consider a follow-up MRI on a   nonemergent basis for definitive characterization.      GILDA REIS MD            SYSTEM ID:  [id number]         Report per radiology    Laboratory:  Labs Ordered and Resulted from Time of ED Arrival to Time of ED Departure   COMPREHENSIVE METABOLIC PANEL - Abnormal       Result Value    Sodium 138      Potassium 3.8      Chloride 101      Carbon Dioxide (CO2) 26      Anion Gap 11      Urea Nitrogen 13.4      Creatinine 0.99      Calcium 9.1      Glucose 107 (*)     Alkaline Phosphatase 100      AST 40      ALT 53 (*)     Protein Total 7.6      Albumin 4.4      Bilirubin Total 1.3 (*)     GFR Estimate >90     CBC WITH PLATELETS AND DIFFERENTIAL - Abnormal    WBC Count 6.6      RBC Count 5.10      Hemoglobin 14.8      Hematocrit 43.5      MCV 85      MCH 29.0      MCHC 34.0      RDW 12.7      Platelet Count 141 (*)     % Neutrophils 80      % Lymphocytes 10      % Monocytes 8      % Eosinophils 2      % Basophils 0      % Immature Granulocytes 0      NRBCs per 100 WBC 0      Absolute Neutrophils 5.3      Absolute Lymphocytes 0.7 (*)     Absolute Monocytes 0.5      Absolute Eosinophils 0.1      Absolute Basophils 0.0      Absolute  Immature Granulocytes 0.0      Absolute NRBCs 0.0     LIPASE - Normal    Lipase 16     INFLUENZA A/B & SARS-COV2 PCR MULTIPLEX        Procedures       Emergency Department Course & Assessments:             Interventions:  Medications   Saline flush (72 mLs Intravenous Given 23)   iopamidol (ISOVUE-370) solution 111 mL (111 mLs Intravenous Given 23)   piperacillin-tazobactam (ZOSYN) 3.375 g vial to attach to  mL bag (0 g Intravenous Stopped 23)   lactated ringers BOLUS 1,000 mL (1,000 mLs Intravenous New Bag 23)        Independent Interpretation (X-rays, CTs, rhythm strip):  See MDM    Consultations/Discussion of Management or Tests:  I discussed the patient and case with admitting hospitalist    Social Determinants of Health affecting care:  See MDM    Disposition:  The patient was admitted to the hospital under the care of Dr. Jones.     Impression & Plan    CMS Diagnoses: none  Medical Decision Makin year old male presenting w/ LLQ abd pain    Social determinants affecting patient's health include: No significant social determinants negatively affecting patient's health care    I reviewed medical records from  urgent care office visit from 2022    DDx includes diverticulitis, constipation, colitis, enteritis, abdominal pain NOS, UTI, ureteral stone, intra-abdominal abscess.  Doubt vascular catastrophe such as AAA or dissection given history and physical exam.  Labs significant for normal white blood cell count.  Imaging sig for diverticulitis with microperforation as described above.  Antibiotics given as noted above.  Given labs and imaging, patient's presentation consistent with complicated diverticulitis with evidence of perforation on imaging.  First dose antibiotics given as noted above and the patient was subsequently admitted to the hospitalist service for further evaluation and management.   Pt counseled on all results, disposition and diagnosis.   They are understanding and agreeable to plan. Patient admitted in stable condition.        Diagnosis:    ICD-10-CM    1. Diverticulitis of colon  K57.32       2. Colon perforation (H)  K63.1            Discharge Medications:  New Prescriptions    No medications on file        2/14/2023   No att. providers found        Willie Reagan MD  02/14/23 2716

## 2023-02-14 NOTE — ED NOTES
Bed: ED07  Expected date:   Expected time:   Means of arrival:   Comments:  Triage bowel perforation

## 2023-02-14 NOTE — H&P
[facility]    History and Physical - Hospitalist Service       Date of Admission:  2/14/2023    Assessment & Plan      Sonny Deluca is a 42 year old male with history of alcohol use disorder who presents with fever, body aches, abdominal pain for last 4 days.    Acute sigmoid diverticulitis with microperforation  -CT abdomen pelvis with acute sigmoid diverticulitis with microperforation with few punctate foci of free air consistent) sigmoid colon with no free fluid or abscess.  No leukocytosis, low-grade fever in the ED at 99.7  -Started on Zosyn in the ED, continue  -N.p.o., IV hydration  -Symptomatic management with oxycodone/IV Dilaudid/Zofran  -Colorectal surgery consult    COVID-19 infection  -COVID swab returned positive in the ED.  Patient has been vaccinated and boosted x1  -Patient has been having cough and some dyspnea for the last few days, currently on room air saturating in low 90s  -Check chest x-ray, remdesivir for 3 days ,steroids pending chest x-ray  -Special precautions maintain  -COVID-specific labs    Thrombocytopenia  -Mild, no active bleeding.  -Likely from viral illness/could be related to splenomegaly/alcohol use disorder  -Monitor    Splenomegaly with splenic cyst  -Noted incidentally in the CT scan.  Patient has never been told he has splenomegaly  -MRI recommended for further characterization, ordered  -We will check EBV/CMV    History of alcohol use disorder  -Reports using about 40 drinks of hard liquor per week  -Denies any previous history of alcohol withdrawal  -Trying to cut down but declined CD assessment  -Monitor with CIWA protocol, as needed benzodiazepine for withdrawal  -IV/oral multivitamins  -Electrolyte replacement    Mild hyperbilirubinemia/ALT  -Likely from his alcohol use disorder  -Mild, monitor         Diet:  N.p.o.  DVT Prophylaxis: Pneumatic Compression Devices  Rao Catheter: Not present  Lines: None     Cardiac Monitoring: None  Code  "Status:  Full code, discussed with patient    Clinically Significant Risk Factors Present on Admission                       # Obesity: Estimated body mass index is 31.57 kg/m  as calculated from the following:    Height as of this encounter: 1.778 m (5' 10\").    Weight as of this encounter: 99.8 kg (220 lb).           Disposition Plan           Zulema Jones MD  Hospitalist Service  [facility]  Securely message with [employer] (more info)  Text page via [employer] Paging/Directory     ______________________________________________________________________    Chief Complaint   Fevers, chills, left lower abdominal pain    History is obtained from the patient    History of Present Illness   Sonny Deluca is a 42 year old male with history of alcohol use disorder who presents with fever, body aches, abdominal pain for last 3 days.  The patient reported that on Saturday evening he started having chills, low-grade fever, left lower quadrant abdominal pain.  Gradually started getting worse.  He measured a temperature of upto 100.3 at home associated with chills.  He denies any nausea or vomiting.  He reports that he does not have a regular bowel movement but denies any new changes in his bowels.  Denies any hematochezia/diarrhea.    The patient also drinks about 40 drinks of hard liquor(vodka) in a week.  Denies any previous history of alcohol withdrawal.  He is trying to cut down.    When he presented to the ED, vitals showed temperature 99.7 pulse rate 98 blood pressure 142/85 respiratory rate 18 O2 sat 95% on room air.  Labs unremarkable BMP, ALT slightly elevated to 53 and total bilirubin 1.3 otherwise unremarkable LFTs.  CT abdomen pelvis shows acute sigmoid diverticulitis with microperforation with a few punctate foci of free air space adjacent to the inflamed sigmoid colon, no free fluid or abscesses.  Also note splenomegaly with the spleen measuring about 25 cm and large simple cyst in the " spleen measuring about 19 cm.  Consider follow-up MRI on nonemergent basis for definitive characterization.  Patient was started on IV      Past Medical History    Past Medical History:   Diagnosis Date     NO ACTIVE PROBLEMS        Past Surgical History   Past Surgical History:   Procedure Laterality Date     NO HISTORY OF SURGERY         Prior to Admission Medications   Prior to Admission Medications   Prescriptions Last Dose Informant Patient Reported? Taking?   calcium carbonate (TUMS) 500 MG chewable tablet   Yes No   Sig: Take 1 chew tab by mouth as needed    Patient not taking: Reported on 2/13/2023   cetirizine (ZYRTEC) 10 MG tablet   Yes No   Sig: Take 10 mg by mouth daily   Patient not taking: Reported on 2/13/2023      Facility-Administered Medications: None        Social History   I have reviewed this patient's social history and updated it with pertinent information if needed.  Social History     Tobacco Use     Smoking status: Never     Smokeless tobacco: Never   Vaping Use     Vaping Use: Never used   Substance Use Topics     Alcohol use: No     Drug use: No       Family History   I have reviewed this patient's family history and updated it with pertinent information if needed.  Family History   Problem Relation Age of Onset     Alcohol/Drug Father      Depression Mother         medicated     Cancer Paternal Grandfather         stomach     Family History Negative Sister        Allergies   No Known Allergies     Physical Exam   Vital Signs: Temp: 99.7  F (37.6  C) Temp src: Oral BP: (!) 142/85 Pulse: 98   Resp: 18 SpO2: 95 %      Weight: 220 lbs 0 oz    Exam:  Constitutional: Awake, alert and no distress. Appears comfortable  Head: Normocephalic. No masses, lesions, tenderness or abnormalities  ENT: ENT exam normal, no neck nodes or sinus tenderness  Cardiovascular: RRR.  No murmurs, no rubs or JVD  Respiratory: Normal WOB,b/l equal air entry, no wheezes or crackles   Gastrointestinal: Abdomen soft,  left lower quadrant tenderness present, obese. BS normal. No masses, organomegaly  : Deferred  Extremities : No edema , no clubbing or cyanosis    Neurologic: Cranial nerves II-XII grossly intact , power symmetrical, Reflexes normal and symmetric. Sensation grossly WNL.      Medical Decision Making       80 MINUTES SPENT BY ME on the date of service doing chart review, history, exam, documentation & further activities per the note.      Data     I have personally reviewed the following data over the past 24 hrs:    6.6  \   14.8   / 141 (L)     138 101 13.4 /  107 (H)   3.8 26 0.99 \       ALT: 53 (H) AST: 40 AP: 100 TBILI: 1.3 (H)   ALB: 4.4 TOT PROTEIN: 7.6 LIPASE: 16       Imaging results reviewed over the past 24 hrs:   Recent Results (from the past 24 hour(s))   CT Abdomen Pelvis w Contrast    Narrative    CT ABDOMEN PELVIS W CONTRAST 2/14/2023 8:16 AM    CLINICAL HISTORY: LLQ abd pain    TECHNIQUE: CT scan of the abdomen and pelvis was performed following  injection of IV contrast. Multiplanar reformats were obtained. Dose  reduction techniques were used.  CONTRAST: 111mL Isovue-370    COMPARISON: None.    FINDINGS:   LOWER CHEST: Normal.    HEPATOBILIARY: Normal.    PANCREAS: Normal.    SPLEEN: Splenomegaly with the spleen measuring 25 cm in craniocaudal  dimension. Large simple cyst with thin wall calcifications in the  spleen measuring 18.6 x 15.6 cm.    ADRENAL GLANDS: Normal.    KIDNEYS/BLADDER: Left renal small simple cyst requiring no specific  follow-up measuring 1.8 cm. Otherwise normal kidneys.    BOWEL: Mild colonic diverticulosis with fat stranding around the  diverticular segment of sigmoid colon. A few punctate foci of  extraluminal gas adjacent to the inflamed sigmoid colon (series 2,  image 198, likely related to microperforation. No significant amount  of free fluid or an abscess around the inflamed sigmoid colon. No  small bowel or colonic obstruction. Normal appendix.    PELVIC ORGANS: No  pelvic masses.    ADDITIONAL FINDINGS: No lymphadenopathy in the abdomen and pelvis. No  free fluid or fluid collections.    MUSCULOSKELETAL: Unremarkable.      Impression    IMPRESSION:   1.  Acute sigmoid diverticulitis with microperforation, with a few  punctate foci of free air adjacent to the inflamed sigmoid colon. No  free fluid or abscess.  2.  Splenomegaly with the spleen measuring about 25 cm in length.  Large simple cyst in the spleen measuring about 19 cm with mild  peripheral wall calcifications. Consider a follow-up MRI on a  nonemergent basis for definitive characterization.    GILDA REIS MD         SYSTEM ID:  [id number]     Recent Labs   Lab 02/14/23  0609 02/13/23  1232   WBC 6.6 10.0   HGB 14.8 14.8   MCV 85 85   * 140*     --    POTASSIUM 3.8  --    CHLORIDE 101  --    CO2 26  --    BUN 13.4  --    CR 0.99  --    ANIONGAP 11  --    JAILENE 9.1  --    *  --    ALBUMIN 4.4  --    PROTTOTAL 7.6  --    BILITOTAL 1.3*  --    ALKPHOS 100  --    ALT 53*  --    AST 40  --    LIPASE 16  --

## 2023-02-14 NOTE — CONSULTS
[facility]  Colon and Rectal Surgery Consult Note  Name: Sonny Deluca    MRN: [id number]  YOB: 1980    Age: 42 year old  Date of admission: 2/14/2023  Primary care provider: Tonya Paredes     Requesting Physician:  Dr. Jones  Reason for consult:  Diverticulitis with microperforation           History of Present Illness:   Sonny Deluca is a 42 year old male with a history of alcohol use disorder, seen at the request of Dr. Jones, who presents with abdominal pain. Three days ago he developed LLQ abdominal pain. The pain has gradually gotten worse. He has also been having fevers (up to 100.3), chills, sweats, and body aches. Denies nausea or vomiting. No recent changes in BMs, no melena or hematochezia. The body aches started to improve but the abdominal pain persisted. He presented to urgent care yesterday for evaluation of the pain, and there was significant LLQ tenderness on exam so he was sent to the ED for a CT and further evaluation.    Temp mildly elevated at 99.7 otherwise vitals have been normal since presentation. Initial labs done at urgent care were overall unremarkable with WBC 10.0, Hgb 14.8. , UA with bili, ketones, albumin, and nitrates. CT abd/pelvis was done which showed acute sigmoid diverticulitis with a few punctate foci of extraluminal gas likely related to microperforation. No gross free air, free fluid, abscess, or obstruction. Also, there is splenomegaly and a large simple cyst in the spleen measuring 18.6 x 15.6 cm. Labs this AM show WBC 6.6, , ALT 53, TBili 1.3, normal lipase, and COVID positive. He is being admitted for further management.    Sonny reports his abdominal pain is slightly improved today. He is not passing gas, thinks his last BM was yesterday. Denies nausea or vomiting. Remains hemodynamically stable. He was unaware he had a large splenic cyst. Denies chronic abdominal pain/discomfort or early satiety. Drinks about 40  "drinks a week, has never had withdrawal, has never pursued counseling for alcohol use. Does not smoke.       Colonoscopy History:  None    Past abdominal surgery: None            Past Medical History:     Past Medical History:   Diagnosis Date     NO ACTIVE PROBLEMS              Past Surgical History:     Past Surgical History:   Procedure Laterality Date     NO HISTORY OF SURGERY                 Social History:     Social History     Tobacco Use     Smoking status: Never     Smokeless tobacco: Never   Substance Use Topics     Alcohol use: No             Family History:     Family History   Problem Relation Age of Onset     Alcohol/Drug Father      Depression Mother         medicated     Cancer Paternal Grandfather         stomach     Family History Negative Sister              Allergies:   No Known Allergies          Medications:             Review of Systems:   A comprehensive greater than 10 system review of systems was carried out.  Pertinent positives and negatives are noted above.  Otherwise negative for contributory info.            Physical Exam:     Blood pressure 108/78, pulse 95, temperature 99.7  F (37.6  C), temperature source Oral, resp. rate 18, height 1.778 m (5' 10\"), weight 99.8 kg (220 lb), SpO2 99 %.  No intake or output data in the 24 hours ending 02/14/23 1042  Exam:  General - Awake alert and oriented, appears stated age  Pulm - Non-labored breathing with normal respiratory effort  CVS - reg rate and rhythm, no peripheral edema  Abd - soft, non-distended, mildly tender in LLQ.  No guarding, rigidity or peritoneal signs.   Neuro - CN II-XII grossly intact  Musculoskeletal - extremities with no clubbing, cyanosis or edema; able to ambulate  Psych - responsive, alert, cooperative; oriented x3; appropriate mood and affect  External/skin - inspection reveals no rashes, lesions or ulcers, normal coloring             Data Reviewed:     Recent Results (from the past 24 hour(s))   CT Abdomen Pelvis w " Contrast    Narrative    CT ABDOMEN PELVIS W CONTRAST 2/14/2023 8:16 AM    CLINICAL HISTORY: LLQ abd pain    TECHNIQUE: CT scan of the abdomen and pelvis was performed following  injection of IV contrast. Multiplanar reformats were obtained. Dose  reduction techniques were used.  CONTRAST: 111mL Isovue-370    COMPARISON: None.    FINDINGS:   LOWER CHEST: Normal.    HEPATOBILIARY: Normal.    PANCREAS: Normal.    SPLEEN: Splenomegaly with the spleen measuring 25 cm in craniocaudal  dimension. Large simple cyst with thin wall calcifications in the  spleen measuring 18.6 x 15.6 cm.    ADRENAL GLANDS: Normal.    KIDNEYS/BLADDER: Left renal small simple cyst requiring no specific  follow-up measuring 1.8 cm. Otherwise normal kidneys.    BOWEL: Mild colonic diverticulosis with fat stranding around the  diverticular segment of sigmoid colon. A few punctate foci of  extraluminal gas adjacent to the inflamed sigmoid colon (series 2,  image 198, likely related to microperforation. No significant amount  of free fluid or an abscess around the inflamed sigmoid colon. No  small bowel or colonic obstruction. Normal appendix.    PELVIC ORGANS: No pelvic masses.    ADDITIONAL FINDINGS: No lymphadenopathy in the abdomen and pelvis. No  free fluid or fluid collections.    MUSCULOSKELETAL: Unremarkable.      Impression    IMPRESSION:   1.  Acute sigmoid diverticulitis with microperforation, with a few  punctate foci of free air adjacent to the inflamed sigmoid colon. No  free fluid or abscess.  2.  Splenomegaly with the spleen measuring about 25 cm in length.  Large simple cyst in the spleen measuring about 19 cm with mild  peripheral wall calcifications. Consider a follow-up MRI on a  nonemergent basis for definitive characterization.    GILDA REIS MD         SYSTEM ID:  [id number]       Recent Labs   Lab 02/14/23  0609 02/13/23  1232   WBC 6.6 10.0   HGB 14.8 14.8   HCT 43.5 42.9   MCV 85 85   * 140*     Recent Labs   Lab  02/14/23  0609      POTASSIUM 3.8   CHLORIDE 101   CO2 26   ANIONGAP 11   *   BUN 13.4   CR 0.99   GFRESTIMATED >90   JAILENE 9.1   PROTTOTAL 7.6   ALBUMIN 4.4   BILITOTAL 1.3*   ALKPHOS 100   AST 40   ALT 53*         Assessment and Plan:   Sonny Deluca is a 42 year old male with alcohol use disorder who presented with 3 days of low grade fevers, body aches, and gradually worsening LLQ abdominal pain. Vitals and labs have been stable, no leukocytosis, but he is COVID positive. CT abdomen/pelvis showed acute sigmoid diverticulitis with microperforation, as well as incidental splenomegaly with 18.6 x 15.6 cm simple cyst in the spleen. He was unaware of this and does not seem to have any chronic symptoms that could be attributed to the cyst. He is being admitted and has been started on Zosyn. Mild LLQ tenderness otherwise benign abdominal exam.    No indication for acute surgical intervention at this time. Recommend continuing with conservative management with bowel rest, IV antibiotics, IVF, and serial abdominal exams. Suspect this episode will resolve and he is unlikely to need surgery this admission, however if he does not respond to conservative management or worsens acutely then he may need surgery which would likely be an open sigmoid colectomy with end colostomy. He will need a colonoscopy 6-8 weeks after discharge for further evaluation. 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 MRI and EBV/CMV have been ordered. Recommend outpatient consult with general surgery for further evaluation and discussion about if this is something that should be addressed at some point. CRS will continue to follow.    Plan:  1. Admit to hospitalist  2. Surgery: No indication for urgent surgery at this time.  3. Diet: Ok for clear liquids  4. IV Fluids: Continue  5. Antibiotics:  Continue Zosyn  6. Medications:  Per primary  7. I&O s:  strict I&O s   8. Labs:    - Reviewed: Yes  - Trend WBC   9. Imaging:   - Dr. Baker and myself have personally viewed: CT abd/pelvis  10. Activity:  OOB, ambulate as able  11. DVT prophylaxis: SCD s  12. This plan has been discussed with Dr. Baker    Patient specific identified risk factors considered as part of today s evaluation include: Alcohol use, large splenic cyst, COVID +     Additional history obtained from patient, chart review.  Time spent on consultation: 40 minutes, greater than 50% spent on counseling and/or coordination of care.      Raul Jessica PA-C  Colorectal Physician Assistant    [facility]  [address] [facility] [address]  [place] [address]  T: [phone]  F: [phone]

## 2023-02-14 NOTE — ED TRIAGE NOTES
Patient comes in with complaints of fevers, body aches and abdominal pain since Saturday. Denies blood in stool. Denies N/V/D. States pain is in lower left abdomen and is stabbing. Seen at  yesterday and told he had diverticulitis.      Triage Assessment       Row Name 02/14/23 0600       Triage Assessment (Adult)    Airway WDL WDL       Respiratory WDL    Respiratory WDL WDL       Skin Circulation/Temperature WDL    Skin Circulation/Temperature WDL --  fevers       Cardiac WDL    Cardiac WDL WDL       Peripheral/Neurovascular WDL    Peripheral Neurovascular WDL WDL       Cognitive/Neuro/Behavioral WDL    Cognitive/Neuro/Behavioral WDL WDL

## 2023-02-15 LAB
ALBUMIN SERPL BCG-MCNC: 3.3 G/DL (ref 3.5–5.2)
ALP SERPL-CCNC: 78 U/L (ref 40–129)
ALT SERPL W P-5'-P-CCNC: 39 U/L (ref 10–50)
ANION GAP SERPL CALCULATED.3IONS-SCNC: 10 MMOL/L (ref 7–15)
AST SERPL W P-5'-P-CCNC: 37 U/L (ref 10–50)
BACTERIA UR CULT: NO GROWTH
BILIRUB SERPL-MCNC: 1.2 MG/DL
BUN SERPL-MCNC: 13.2 MG/DL (ref 6–20)
CALCIUM SERPL-MCNC: 8.4 MG/DL (ref 8.6–10)
CHLORIDE SERPL-SCNC: 105 MMOL/L (ref 98–107)
CMV IGG SERPL IA-ACNC: 6.3 U/ML
CMV IGG SERPL IA-ACNC: ABNORMAL
CREAT SERPL-MCNC: 0.93 MG/DL (ref 0.67–1.17)
CRP SERPL-MCNC: 128.99 MG/L
D DIMER PPP FEU-MCNC: 1 UG/ML FEU (ref 0–0.5)
DEPRECATED HCO3 PLAS-SCNC: 21 MMOL/L (ref 22–29)
EBV EA-D IGG SER-ACNC: 8.2 U/ML (ref 0–9)
EBV EA-D IGG SER-ACNC: NORMAL
ERYTHROCYTE [DISTWIDTH] IN BLOOD BY AUTOMATED COUNT: 12.9 % (ref 10–15)
FIBRINOGEN PPP-MCNC: 496 MG/DL (ref 170–490)
GFR SERPL CREATININE-BSD FRML MDRD: >90 ML/MIN/1.73M2
GLUCOSE SERPL-MCNC: 88 MG/DL (ref 70–99)
HCT VFR BLD AUTO: 35.9 % (ref 40–53)
HGB BLD-MCNC: 12.3 G/DL (ref 13.3–17.7)
MCH RBC QN AUTO: 29.2 PG (ref 26.5–33)
MCHC RBC AUTO-ENTMCNC: 34.3 G/DL (ref 31.5–36.5)
MCV RBC AUTO: 85 FL (ref 78–100)
PLATELET # BLD AUTO: 101 10E3/UL (ref 150–450)
POTASSIUM SERPL-SCNC: 4 MMOL/L (ref 3.4–5.3)
PROT SERPL-MCNC: 6.2 G/DL (ref 6.4–8.3)
RBC # BLD AUTO: 4.21 10E6/UL (ref 4.4–5.9)
SODIUM SERPL-SCNC: 136 MMOL/L (ref 136–145)
WBC # BLD AUTO: 4.7 10E3/UL (ref 4–11)

## 2023-02-15 PROCEDURE — 250N000013 HC RX MED GY IP 250 OP 250 PS 637: Performed by: EMERGENCY MEDICINE

## 2023-02-15 PROCEDURE — 120N000001 HC R&B MED SURG/OB

## 2023-02-15 PROCEDURE — 85384 FIBRINOGEN ACTIVITY: CPT | Performed by: INTERNAL MEDICINE

## 2023-02-15 PROCEDURE — 86140 C-REACTIVE PROTEIN: CPT | Performed by: INTERNAL MEDICINE

## 2023-02-15 PROCEDURE — 258N000003 HC RX IP 258 OP 636: Performed by: INTERNAL MEDICINE

## 2023-02-15 PROCEDURE — 82374 ASSAY BLOOD CARBON DIOXIDE: CPT | Performed by: INTERNAL MEDICINE

## 2023-02-15 PROCEDURE — 250N000013 HC RX MED GY IP 250 OP 250 PS 637: Performed by: INTERNAL MEDICINE

## 2023-02-15 PROCEDURE — 250N000011 HC RX IP 250 OP 636: Performed by: EMERGENCY MEDICINE

## 2023-02-15 PROCEDURE — 85014 HEMATOCRIT: CPT | Performed by: INTERNAL MEDICINE

## 2023-02-15 PROCEDURE — 36415 COLL VENOUS BLD VENIPUNCTURE: CPT | Performed by: INTERNAL MEDICINE

## 2023-02-15 PROCEDURE — 250N000011 HC RX IP 250 OP 636: Performed by: INTERNAL MEDICINE

## 2023-02-15 PROCEDURE — 85379 FIBRIN DEGRADATION QUANT: CPT | Performed by: INTERNAL MEDICINE

## 2023-02-15 PROCEDURE — 99232 SBSQ HOSP IP/OBS MODERATE 35: CPT | Performed by: INTERNAL MEDICINE

## 2023-02-15 RX ORDER — ACETAMINOPHEN 500 MG
1000 TABLET ORAL ONCE
Status: COMPLETED | OUTPATIENT
Start: 2023-02-15 | End: 2023-02-15

## 2023-02-15 RX ORDER — KETOROLAC TROMETHAMINE 15 MG/ML
15 INJECTION, SOLUTION INTRAMUSCULAR; INTRAVENOUS ONCE
Status: COMPLETED | OUTPATIENT
Start: 2023-02-15 | End: 2023-02-15

## 2023-02-15 RX ORDER — NALOXONE HYDROCHLORIDE 0.4 MG/ML
0.2 INJECTION, SOLUTION INTRAMUSCULAR; INTRAVENOUS; SUBCUTANEOUS
Status: DISCONTINUED | OUTPATIENT
Start: 2023-02-15 | End: 2023-02-16 | Stop reason: HOSPADM

## 2023-02-15 RX ORDER — NALOXONE HYDROCHLORIDE 0.4 MG/ML
0.4 INJECTION, SOLUTION INTRAMUSCULAR; INTRAVENOUS; SUBCUTANEOUS
Status: DISCONTINUED | OUTPATIENT
Start: 2023-02-15 | End: 2023-02-16 | Stop reason: HOSPADM

## 2023-02-15 RX ADMIN — ACETAMINOPHEN 1000 MG: 500 TABLET ORAL at 12:53

## 2023-02-15 RX ADMIN — PIPERACILLIN AND TAZOBACTAM 3.38 G: 3; .375 INJECTION, POWDER, FOR SOLUTION INTRAVENOUS at 20:30

## 2023-02-15 RX ADMIN — SODIUM CHLORIDE 50 ML: 9 INJECTION, SOLUTION INTRAVENOUS at 12:44

## 2023-02-15 RX ADMIN — FOLIC ACID 1 MG: 1 TABLET ORAL at 08:11

## 2023-02-15 RX ADMIN — KETOROLAC TROMETHAMINE 15 MG: 15 INJECTION, SOLUTION INTRAMUSCULAR; INTRAVENOUS at 04:07

## 2023-02-15 RX ADMIN — MULTIPLE VITAMINS W/ MINERALS TAB 1 TABLET: TAB at 08:11

## 2023-02-15 RX ADMIN — PIPERACILLIN AND TAZOBACTAM 3.38 G: 3; .375 INJECTION, POWDER, FOR SOLUTION INTRAVENOUS at 03:35

## 2023-02-15 RX ADMIN — PIPERACILLIN AND TAZOBACTAM 3.38 G: 3; .375 INJECTION, POWDER, FOR SOLUTION INTRAVENOUS at 15:16

## 2023-02-15 RX ADMIN — PIPERACILLIN AND TAZOBACTAM 3.38 G: 3; .375 INJECTION, POWDER, FOR SOLUTION INTRAVENOUS at 08:12

## 2023-02-15 RX ADMIN — THIAMINE HCL TAB 100 MG 100 MG: 100 TAB at 08:11

## 2023-02-15 RX ADMIN — REMDESIVIR 100 MG: 100 INJECTION, POWDER, LYOPHILIZED, FOR SOLUTION INTRAVENOUS at 11:36

## 2023-02-15 ASSESSMENT — ACTIVITIES OF DAILY LIVING (ADL)
ADLS_ACUITY_SCORE: 35

## 2023-02-15 NOTE — PROGRESS NOTES
COLON & RECTAL SURGERY  PROGRESS NOTE    February 15, 2023    SUBJECTIVE:  Boarding in the ED. Feeling better. Minimal abdominal pain. Passing gas, no BM for a few days. Tolerating CLD without n/v. AVSS. WBC 4.7.    OBJECTIVE:  Temp:  [98  F (36.7  C)] 98  F (36.7  C)  Pulse:  [] 74  Resp:  [16] 16  BP: (102-148)/() 125/81  SpO2:  [89 %-99 %] 96 %    Intake/Output Summary (Last 24 hours) at 2/15/2023 0900  Last data filed at 2/15/2023 0411  Gross per 24 hour   Intake 10 ml   Output --   Net 10 ml       GENERAL:  Awake, alert, no acute distress  HEAD: Normocephalic atraumatic  SCLERA: Anicteric  EXTREMITIES: Warm and well perfused  ABDOMEN:  Soft, non-distended, minimally tender in LLQ. No guarding, rigidity, or peritoneal signs. Splenomegaly.    LABS:  Lab Results   Component Value Date    WBC 4.7 02/15/2023    WBC 6.8 04/20/2015     Lab Results   Component Value Date    HGB 12.3 02/15/2023    HGB 13.9 04/20/2015     Lab Results   Component Value Date    HCT 35.9 02/15/2023    HCT 40.6 04/20/2015     Lab Results   Component Value Date     02/15/2023     04/20/2015     Last Basic Metabolic Panel:  Lab Results   Component Value Date     02/15/2023     04/20/2015      Lab Results   Component Value Date    POTASSIUM 4.0 02/15/2023    POTASSIUM 4.1 04/20/2015     Lab Results   Component Value Date    CHLORIDE 105 02/15/2023    CHLORIDE 103 04/20/2015     Lab Results   Component Value Date    JAILENE 8.4 02/15/2023    JAILENE 8.8 04/20/2015     Lab Results   Component Value Date    CO2 21 02/15/2023     Lab Results   Component Value Date    BUN 13.2 02/15/2023    BUN 10 04/20/2015    BUN 12 04/20/2015     Lab Results   Component Value Date    CR 0.93 02/15/2023    CR 0.83 04/20/2015     Lab Results   Component Value Date    GLC 88 02/15/2023    GLC 89 04/20/2015       ASSESSMENT/PLAN: 41 yo M with first episode of diverticulitis with microperforation, CT also shows incidental splenomegaly  with 19 cm splenic cyst. COVID +. Hemodynamically stable, normal WBC, abdominal pain significantly improved today.    - Full liquid diet, continue for 3-5 days then advance as tolerated  - PRN pain meds  - Continue Zosyn while inpatient, transition to PO Augmentin at discharge for total of 7 days  - OOB, ambulate  - COVID treatment per hospitalist, would avoid steroids in setting of acute diverticulitis  - Consider outpatient follow up with general surgery for splenic cyst  - If tolerating full liquid diet ok to discharge today from CRS standpoint. Our office will arrange colonoscopy in 8 weeks.     Discussed with Dr. Baker.    For questions/paging, please contact the CRS office at [phone].    Raul Jessica PA-C  Colorectal Physician Assistant    [facility]  [address] [place] [address] [place] [address]  T: [phone]  F: [phone]

## 2023-02-15 NOTE — PROGRESS NOTES
ED admit. Pt up ind in room, denies pain. On covid precautions. Surgical consult in place for splenomegaly. On full liquid diet.

## 2023-02-15 NOTE — PROGRESS NOTES
[facility]  Hospitalist Progress Note    Date of Admission: 2/14/2023    Interval History   Patient really reports that he is feeling better.  Felt like he could just go home and get fluids and stay on antibiotics.  Discussion with colorectal who feels patient is likely doing okay, on a liquid diet.  In addition found to have a fairly large spleen with presumed cyst.  Attempting to get follow-up arranged and further assessment.    Assessment & Plan   Sonny Deluca is a 42 year old male with history of alcohol use disorder who presents with fever, body aches, abdominal pain for last 4 days.     Acute sigmoid diverticulitis with microperforation    CT abdomen pelvis with acute sigmoid diverticulitis with microperforation with few punctate foci of free air consistent) sigmoid colon with no free fluid or abscess.      No leukocytosis, low-grade fever in the ED at 99.7    Started on Zosyn in the ED, continue    Currently on liquid diet with IV fluids    Colorectal surgery consult    NS at 100     BMP stable    WBC 4.7    Plan to change to Augmentin probable tomorrow with discharge     COVID-19 infection    COVID swab returned positive in the ED.  Patient has been vaccinated and boosted x1    Patient has been having cough and some dyspnea for the last few days, currently on room air saturating in low 90s: He reported symptoms started on Saturday.    2/14 chest x-ray with no infiltrates>> currently on room air    COVID precautions    D-dimer 1        Thrombocytopenia    -Mild, no active bleeding.    -Likely from viral illness/could be related to splenomegaly/alcohol use disorder    -Monitor    140 on admission down to 101     Splenomegaly with splenic cyst    Noted incidentally in the CT scan.  Patient has never been told he has splenomegaly    2/14 with splenomegaly on MRI.  Large splenic cyst with proteinaceous contents without suspicious associated enhancement.  There is mass effect  on the stomach and left kidney from the splenomegaly.  Asymptomatic surgical consult on a nonemergent basis.    Positive antibody CMV IgG    EBV negative    Discussed with surgery for likely follow-up outpatient.    Surgery to see if still in hospital for follow up      History of alcohol use disorder    Reports using about 40 drinks of hard liquor per week    Denies any previous history of alcohol withdrawal    Trying to cut down but declined CD assessment    Monitor with CIWA protocol, as needed benzodiazepine for withdrawal    IV/oral multivitamins    -Electrolyte replacement>> magnesium normal on admission, phosphorus okay and potassium     Mild hyperbilirubinemia/ALT    Likely from his alcohol use disorder    Mild, monitor    Total bilirubin 1.3, ALT 53 AST 40     2/15 labs better with ALT 39, AST 37 and bilirubin 1.2     Diet:  liquid   DVT Prophylaxis: Pneumatic Compression Devices  Rao Catheter: Not present  Lines: None     Cardiac Monitoring: None  Code Status:  Full code, discussed with patient  DVT Prophylaxis: ambulate   Code Status: Full Code       TERESA MEJIAS MD,   Hospitalist Service  [facility]  ______________________________________________________________________    -Data reviewed today: I reviewed all new labs and imaging results over the last 24 hours. I personally reviewed no images or EKG's today.    Physical Exam   Temp: 98  F (36.7  C) Temp src: Oral BP: 113/69 Pulse: 82   Resp: 16 SpO2: 98 % O2 Device: None (Room air) Oxygen Delivery: (P) 2 LPM  Vitals:    02/14/23 0559   Weight: 99.8 kg (220 lb)   Constitutional: Appears stated age, no acute distress.  Respiratory: Breath sounds CTA. No increased work of breathing.  Cardiovascular: RRR, no rub or murmur. No peripheral edema.  GI: Abdomen is distended and protuberant.  There is no rebound or guarding.  Potential mild tenderness.  Skin: Warm, dry, no rashes or lesions.  No edema    Medications     sodium  chloride 100 mL/hr at 02/15/23 0957       remdesivir  100 mg Intravenous Q24H    And     sodium chloride 0.9%  50 mL Intravenous Q24H     folic acid  1 mg Oral Daily     multivitamin w/minerals  1 tablet Oral Daily     piperacillin-tazobactam  3.375 g Intravenous Q6H     sodium chloride (PF)  3 mL Intracatheter Q8H     thiamine  100 mg Oral Daily       Data   Recent Labs   Lab 02/15/23  0704 02/15/23  0556 02/14/23  0609 02/13/23  1232   WBC 4.7  --  6.6 10.0   HGB 12.3*  --  14.8 14.8   MCV 85  --  85 85   *  --  141* 140*   NA  --  136 138  --    POTASSIUM  --  4.0 3.8  --    CHLORIDE  --  105 101  --    CO2  --  21* 26  --    BUN  --  13.2 13.4  --    CR  --  0.93 0.99  --    ANIONGAP  --  10 11  --    JAILENE  --  8.4* 9.1  --    GLC  --  88 107*  --    ALBUMIN  --  3.3* 4.4  --    PROTTOTAL  --  6.2* 7.6  --    BILITOTAL  --  1.2 1.3*  --    ALKPHOS  --  78 100  --    ALT  --  39 53*  --    AST  --  37 40  --    LIPASE  --   --  16  --        Imaging:  Recent Results (from the past 24 hour(s))   XR Chest Port 1 View    Narrative    XR CHEST PORT 1 VIEW 2/14/2023 12:45 PM    HISTORY: COVID-19 infection, dyspnea    COMPARISON: None.      Impression    IMPRESSION: No acute cardiopulmonary process.    GILDA REIS MD         SYSTEM ID:  [id number]   MR Abdomen w/o & w Contrast    Narrative    MRI ABDOMEN    CLINICAL HISTORY:  Splenomegaly    TECHNIQUE: MRI of the abdomen without and with intravenous contrast.  Contrast dose: 10 mL Gadavist  Comparison study: CT of the abdomen earlier today    FINDINGS:  Redemonstration of splenomegaly with the spleen measuring 25 cm in  craniocaudal length. Again seen is a large splenic cyst measuring 18.1  x 15.3 x 20.0 cm (series 701, image 25 and series 501, image 19) with  T1 and T2 hyperintense contents, likely due to presence of  proteinaceous material. This cyst is nonenhancing on postcontrast  subtraction images. Otherwise, normal appearance of the splenic  parenchyma.  There is mild mass effect on the greater curvature of the  stomach and the left kidney from the splenomegaly.    A 0.9 cm flash filling hemangioma in the anterior peripheral right  hepatic lobe (series 900, image 145). No hepatic steatosis. No  suspicious lesions in the liver. Normal gallbladder. No biliary ductal  dilatation. The pancreas and adrenal glands are normal. Small simple  cyst in the left kidney and otherwise normal kidneys. Mild  inflammatory changes around the proximal sigmoid colon (series 1200,  image 48), better seen on the CT earlier today, consistent with known  sigmoid diverticulitis. No small bowel or colonic obstruction. No  lymphadenopathy. No ascites.      Impression    IMPRESSION:  1. Splenomegaly. Large splenic cyst with proteinaceous contents  without suspicious associated enhancement. There is mass effect on the  stomach and left kidney from the splenomegaly. If the patient is  symptomatic, surgical consultation on a nonemergent basis can be  considered.  2. Mild acute sigmoid diverticulitis, better assessed on the CT  earlier today.    GILDA REIS MD         SYSTEM ID:  [id number]

## 2023-02-15 NOTE — PROGRESS NOTES
RECEIVING UNIT ED HANDOFF REVIEW    ED Nurse Handoff Report was reviewed by: Christine Lundberg RN on February 15, 2023 at 2:22 PM

## 2023-02-16 VITALS
DIASTOLIC BLOOD PRESSURE: 93 MMHG | HEART RATE: 78 BPM | SYSTOLIC BLOOD PRESSURE: 133 MMHG | RESPIRATION RATE: 17 BRPM | BODY MASS INDEX: 31.5 KG/M2 | HEIGHT: 70 IN | OXYGEN SATURATION: 97 % | TEMPERATURE: 98.1 F | WEIGHT: 220 LBS

## 2023-02-16 LAB
ANION GAP SERPL CALCULATED.3IONS-SCNC: 9 MMOL/L (ref 7–15)
BUN SERPL-MCNC: 9.6 MG/DL (ref 6–20)
CALCIUM SERPL-MCNC: 8.7 MG/DL (ref 8.6–10)
CHLORIDE SERPL-SCNC: 104 MMOL/L (ref 98–107)
CREAT SERPL-MCNC: 0.91 MG/DL (ref 0.67–1.17)
DEPRECATED HCO3 PLAS-SCNC: 27 MMOL/L (ref 22–29)
ERYTHROCYTE [DISTWIDTH] IN BLOOD BY AUTOMATED COUNT: 12.9 % (ref 10–15)
GFR SERPL CREATININE-BSD FRML MDRD: >90 ML/MIN/1.73M2
GLUCOSE SERPL-MCNC: 86 MG/DL (ref 70–99)
HCT VFR BLD AUTO: 36.4 % (ref 40–53)
HGB BLD-MCNC: 12.6 G/DL (ref 13.3–17.7)
MCH RBC QN AUTO: 29.3 PG (ref 26.5–33)
MCHC RBC AUTO-ENTMCNC: 34.6 G/DL (ref 31.5–36.5)
MCV RBC AUTO: 85 FL (ref 78–100)
PLATELET # BLD AUTO: 132 10E3/UL (ref 150–450)
POTASSIUM SERPL-SCNC: 4 MMOL/L (ref 3.4–5.3)
RBC # BLD AUTO: 4.3 10E6/UL (ref 4.4–5.9)
SODIUM SERPL-SCNC: 140 MMOL/L (ref 136–145)
WBC # BLD AUTO: 5.2 10E3/UL (ref 4–11)

## 2023-02-16 PROCEDURE — 250N000011 HC RX IP 250 OP 636: Performed by: INTERNAL MEDICINE

## 2023-02-16 PROCEDURE — 99221 1ST HOSP IP/OBS SF/LOW 40: CPT | Performed by: SURGERY

## 2023-02-16 PROCEDURE — 258N000003 HC RX IP 258 OP 636: Performed by: INTERNAL MEDICINE

## 2023-02-16 PROCEDURE — 85027 COMPLETE CBC AUTOMATED: CPT | Performed by: INTERNAL MEDICINE

## 2023-02-16 PROCEDURE — 250N000013 HC RX MED GY IP 250 OP 250 PS 637: Performed by: INTERNAL MEDICINE

## 2023-02-16 PROCEDURE — 99239 HOSP IP/OBS DSCHRG MGMT >30: CPT | Performed by: INTERNAL MEDICINE

## 2023-02-16 PROCEDURE — 80048 BASIC METABOLIC PNL TOTAL CA: CPT | Performed by: INTERNAL MEDICINE

## 2023-02-16 PROCEDURE — 36415 COLL VENOUS BLD VENIPUNCTURE: CPT | Performed by: INTERNAL MEDICINE

## 2023-02-16 RX ADMIN — PIPERACILLIN AND TAZOBACTAM 3.38 G: 3; .375 INJECTION, POWDER, FOR SOLUTION INTRAVENOUS at 08:21

## 2023-02-16 RX ADMIN — SODIUM CHLORIDE: 9 INJECTION, SOLUTION INTRAVENOUS at 00:05

## 2023-02-16 RX ADMIN — THIAMINE HCL TAB 100 MG 100 MG: 100 TAB at 08:21

## 2023-02-16 RX ADMIN — MULTIPLE VITAMINS W/ MINERALS TAB 1 TABLET: TAB at 08:21

## 2023-02-16 RX ADMIN — FOLIC ACID 1 MG: 1 TABLET ORAL at 08:21

## 2023-02-16 RX ADMIN — PIPERACILLIN AND TAZOBACTAM 3.38 G: 3; .375 INJECTION, POWDER, FOR SOLUTION INTRAVENOUS at 03:09

## 2023-02-16 ASSESSMENT — ACTIVITIES OF DAILY LIVING (ADL)
ADLS_ACUITY_SCORE: 35

## 2023-02-16 NOTE — PROGRESS NOTES
COLON & RECTAL SURGERY  PROGRESS NOTE    February 16, 2023    SUBJECTIVE:  Doing well. Still some mild LLQ pain but overall much improved. Passing gas, had a loose BM. Tolerating FLD without n/v. AVSS. WBC 5.2. CMV +.     OBJECTIVE:  Temp:  [98  F (36.7  C)-98.7  F (37.1  C)] 98.1  F (36.7  C)  Pulse:  [78-86] 78  Resp:  [16-18] 17  BP: (102-133)/(69-93) 133/93  SpO2:  [95 %-98 %] 97 %    Intake/Output Summary (Last 24 hours) at 2/16/2023 0847  Last data filed at 2/15/2023 1333  Gross per 24 hour   Intake 300 ml   Output --   Net 300 ml       GENERAL:  Awake, alert, no acute distress  HEAD: Normocephalic atraumatic  SCLERA: Anicteric  EXTREMITIES: Warm and well perfused  ABDOMEN:  Soft,non-distended, minimally tender in LLQ. No guarding, rigidity, or peritoneal signs. Splenomegaly.    LABS:  Lab Results   Component Value Date    WBC 5.2 02/16/2023    WBC 6.8 04/20/2015     Lab Results   Component Value Date    HGB 12.6 02/16/2023    HGB 13.9 04/20/2015     Lab Results   Component Value Date    HCT 36.4 02/16/2023    HCT 40.6 04/20/2015     Lab Results   Component Value Date     02/16/2023     04/20/2015     Last Basic Metabolic Panel:  Lab Results   Component Value Date     02/16/2023     04/20/2015      Lab Results   Component Value Date    POTASSIUM 4.0 02/16/2023    POTASSIUM 4.1 04/20/2015     Lab Results   Component Value Date    CHLORIDE 104 02/16/2023    CHLORIDE 103 04/20/2015     Lab Results   Component Value Date    JAILENE 8.7 02/16/2023    JAILENE 8.8 04/20/2015     Lab Results   Component Value Date    CO2 27 02/16/2023     Lab Results   Component Value Date    BUN 9.6 02/16/2023    BUN 10 04/20/2015    BUN 12 04/20/2015     Lab Results   Component Value Date    CR 0.91 02/16/2023    CR 0.83 04/20/2015     Lab Results   Component Value Date    GLC 86 02/16/2023    GLC 89 04/20/2015       ASSESSMENT/PLAN: 41 yo M with first episode of diverticulitis with microperforation, CT also shows  incidental splenomegaly with 19 cm splenic cyst. COVID +. Hemodynamically stable, normal WBC, abdominal pain significantly improved, having bowel function.     - Full liquid diet, continue for 3-5 days then advance as tolerated  - PRN pain meds  - Continue Zosyn while inpatient, transition to PO Augmentin at discharge for 7 days  - OOB, ambulate  - COVID treatment per hospitalist, would avoid steroids in setting of acute diverticulitis  - General surgery consult per hospitalist for splenic cyst  - Ok to discharge today from CRS standpoint. Our office will arrange colonoscopy in 8 weeks.     Discussed with Dr. Baker.    For questions/paging, please contact the CRS office at [phone].    Raul Jessica PA-C  Colorectal Physician Assistant    [facility]  [address]  [place] [address]  T: [phone]  F: [phone]

## 2023-02-16 NOTE — CONSULTS
General Surgery  Patient seen and examined.  Presented with acute diverticulitis and managed adequately with nonoperative cares.  Also noted incidentally to have a large, 19 cm simple splenic cyst.  No evidence for malignancy, lymphadenopathy, or complex septations within the cyst.  I have arranged to have the patient seen by interventional radiology as an outpatient for cyst drainage and potential sclerotherapy.  This may prove beneficial to the patient and minimize the likelihood of cyst fluid recurrence.  If the patient does have recurrent fluid accumulation after drainage, consideration would need to be made for either repeat aspiration or surgery, which I fear would mandate splenectomy.  This was explained to the patient and he was in agreement with the plan.  I also discussed the plan with the hospitalist.  Julito Simpson MD  General Surgery, Office [phone]

## 2023-02-16 NOTE — PLAN OF CARE
Goal Outcome Evaluation:    Orientation: A+Ox4    Vitals/Tele: VSS, on RA, denies pain    IV Access/drains: PIV infusing @100    Diet: Full liquids     Mobility: Independent     GI/: continent     Wound/Skin: WNL    Consult: Colorectal surgery    Discharge Plan: Likely to discharge today on PO antibiotics       See Flow sheets for assessment

## 2023-02-16 NOTE — DISCHARGE SUMMARY
[facility]  Hospitalist Discharge Summary      Date of Admission:  2/14/2023  Date of Discharge:  2/16/2023  Discharging Provider: TERESA MEJIAS MD  Discharge Service: Hospitalist Service    Discharge Diagnoses   1. Acute sigmoid diverticulitis with microperforation.  Few punctate foci of free air adjacent to the inflamed sigmoid colon.  No free fluid or abscess  2. Splenomegaly of 25 cm with a large simple cyst in the spleen up to 19 cm  3. 2/15 MRI confirming large splenic cyst with proteinaceous contents without suspicious associated enhancement.  Mass effect on the stomach and left kidney from splenomegaly.  4. COVID 19 +   5. Remdesivir for COVID  6. CMV antibody IgG positive/EBV negative  7. Thrombocytopenia improving    Follow-ups Needed After Discharge   Follow-up Appointments     Follow-up and recommended labs and tests       Follow up with Dr. Yovani Baker for a colonoscopy in 8 weeks. Our office   will call you to schedule.    Call the [facility] office at [phone] if you   develop fever >101, worsening or uncontrolled pain, bleeding, nausea,   vomiting, or constipation (no stool for 4-5 days).         Follow-up and recommended labs and tests       Follow up with primary care provider, Tonya Paredes, within 7 days to   evaluate medication change.  The following labs/tests are recommended:   basic metabolic profile and cbc.         Repeat CMV titer     Unresulted Labs Ordered in the Past 30 Days of this Admission     No orders found from 1/15/2023 to 2/15/2023.      These results will be followed up by primary     Discharge Disposition   Discharged to home  Condition at discharge: Stable    Hospital Course    Sonny Deluca is a 42 year old male with history of alcohol use disorder, who presents with fever, body aches, abdominal pain for last 4 days.  Patient complained of pain in the left lower quadrant.  Described as sharp.  When seen in the  emergency room had a white count of 6.6.  CT abdomen pelvis done showing acute sigmoid diverticulitis with microperforation and a few punctate foci of free air adjacent to the inflamed sigmoid colon.  No free fluid or abscess.  Found to have splenomegaly with the spleen measuring about 25 cm in length.  Large cyst noted in his spleen.    Acute sigmoid diverticulitis with microperforation  CT abdomen pelvis with acute sigmoid diverticulitis with microperforation with few punctate foci of free air consistent) sigmoid colon with no free fluid or abscess.    No leukocytosis, low-grade fever in the ED at 99.7.  Patient was started on Zosyn in the emergency room.  Started on liquid diet.  Seen by colorectal surgery.  Placed on fluids.  He was transitioned over to Augmentin.  Given additional 10 days of Augmentin after discharge.  Discussion regarding signs to return to the emergency room including fever, increased pain.  Placed on a liquid diet.  Colorectal surgery recommended 3 to 5 days of liquid diet full then advance as tolerated.  Clinically feeling well on the day of discharge with no significant pain in the left lower quadrant.  Hemodynamically stable.  Bowel function normal.  Okay to discharge from colorectal surgery with a colonoscopy in 8 weeks.     COVID-19 infection  COVID swab returned positive in the ED.  Patient has been vaccinated and boosted x 1. Patient has been having cough and some dyspnea for the last few days prior to admission.  On room air on admission. 2/14 chest x-ray with no infiltrates>> currently on room air. COVID precautions.  D-dimer 1 with  (on COVID panel not for symptoms) .  Patient was given remdesivir. No shortness of breath. No tachycardia. No pulmonary symptoms. No leg edema.  Remained relatively asymptomatic with discharge     Thrombocytopenia  Mild, no active bleeding. Likely from viral illness/could be related to splenomegaly/alcohol use disorder. 140 on admission down to  101.  Platelet improved to 132 with recommended follow-up in the clinic next week     Splenomegaly with splenic cyst  Noted incidentally in the CT scan.  Patient has never been told he has splenomegaly: 2/14 with splenomegaly on MRI.  Large splenic cyst with proteinaceous contents without suspicious associated enhancement.  There is mass effect on the stomach and left kidney from the splenomegaly.  Patient was seen in consultation by general surgery.  General surgery arrange to have patient seen by interventional radiology as an outpatient for cyst drainage and potential sclerotherapy.  This would potentially reduce the risk of cyst fluid recurrence.  Discussion with surgery.     History of alcohol use disorder  Reports using about 40 drinks of hard liquor per week Denies any previous history of alcohol withdrawal. Trying to cut down but declined CD assessment.  Electrolytes stable     Mild hyperbilirubinemia/ALT  Likely from his alcohol use disorder Mild, monitor  Total bilirubin 1.3, ALT 53 AST 40   2/15 labs better with ALT 39, AST 37 and bilirubin 1.2  Can follow-up primary     CMV   Patient with 6.3 + antibody titer IgG (recent or past exposure) rechecked after discharge.  Initially done for splenomegaly.  Jovani-Barr virus titer negative    Consultations This Hospital Stay   COLORECTAL SURGERY IP CONSULT  SURGERY GENERAL IP CONSULT    Code Status   Full Code    Time Spent on this Encounter   I, TERESA MEJIAS MD, personally saw the patient today and spent greater than 30 minutes discharging this patient.       TERESA MEJIAS MD  [facility] GENERAL SURGERY  [address] [place] [address]  Phone: [phone]  Fax: [phone]  ______________________________________________________________________    Physical Exam   Temp: 98.1  F (36.7  C) Temp src: Oral BP: (!) 133/93 Pulse: 78   Resp: 17 SpO2: 97 % O2 Device: None (Room air)    Vitals:    02/14/23 0559   Weight: 99.8 kg (220 lb)      Constitutional: Appears stated age, no acute distress.  Respiratory: Breath sounds CTA. No increased work of breathing.  Cardiovascular: RRR, no rub or murmur. No peripheral edema.  GI: Soft, non-tender, non-distended.  Skin: Warm, dry, no rashes or lesions.  Other: None          Primary Care Physician   Tonya Paredes    Discharge Orders      Follow-up and recommended labs and tests     Follow up with Dr. Yovani Baker for a colonoscopy in 8 weeks. Our office will call you to schedule.    Call the [facility] office at [phone] if you develop fever >101, worsening or uncontrolled pain, bleeding, nausea, vomiting, or constipation (no stool for 4-5 days).     Reason for your hospital stay    Abdominal pain and diverticulitis     Follow-up and recommended labs and tests     Follow up with primary care provider, Tonya Paredes, within 7 days to evaluate medication change.  The following labs/tests are recommended: basic metabolic profile and cbc.     Activity    Your activity upon discharge: activity as tolerated     Brief Discharge Instructions    Cbc with platelet count on Monday or Tuesday on February 20 or 21st.     Diet    Follow this diet upon discharge: Orders Placed This Encounter      Full Liquid Diet for additional 1-2 days then ADAT to soft       Significant Results and Procedures   Most Recent 3 CBC's:Recent Labs   Lab Test 02/16/23  0737 02/15/23  0704 02/14/23  0609   WBC 5.2 4.7 6.6   HGB 12.6* 12.3* 14.8   MCV 85 85 85   * 101* 141*     Most Recent 3 BMP's:Recent Labs   Lab Test 02/16/23  0737 02/15/23  0556 02/14/23  0609    136 138   POTASSIUM 4.0 4.0 3.8   CHLORIDE 104 105 101   CO2 27 21* 26   BUN 9.6 13.2 13.4   CR 0.91 0.93 0.99   ANIONGAP 9 10 11   JAILENE 8.7 8.4* 9.1   GLC 86 88 107*   ,   Results for orders placed or performed during the hospital encounter of 02/14/23   CT Abdomen Pelvis w Contrast    Narrative    CT ABDOMEN PELVIS W CONTRAST 2/14/2023  8:16 AM    CLINICAL HISTORY: LLQ abd pain    TECHNIQUE: CT scan of the abdomen and pelvis was performed following  injection of IV contrast. Multiplanar reformats were obtained. Dose  reduction techniques were used.  CONTRAST: 111mL Isovue-370    COMPARISON: None.    FINDINGS:   LOWER CHEST: Normal.    HEPATOBILIARY: Normal.    PANCREAS: Normal.    SPLEEN: Splenomegaly with the spleen measuring 25 cm in craniocaudal  dimension. Large simple cyst with thin wall calcifications in the  spleen measuring 18.6 x 15.6 cm.    ADRENAL GLANDS: Normal.    KIDNEYS/BLADDER: Left renal small simple cyst requiring no specific  follow-up measuring 1.8 cm. Otherwise normal kidneys.    BOWEL: Mild colonic diverticulosis with fat stranding around the  diverticular segment of sigmoid colon. A few punctate foci of  extraluminal gas adjacent to the inflamed sigmoid colon (series 2,  image 198, likely related to microperforation. No significant amount  of free fluid or an abscess around the inflamed sigmoid colon. No  small bowel or colonic obstruction. Normal appendix.    PELVIC ORGANS: No pelvic masses.    ADDITIONAL FINDINGS: No lymphadenopathy in the abdomen and pelvis. No  free fluid or fluid collections.    MUSCULOSKELETAL: Unremarkable.      Impression    IMPRESSION:   1.  Acute sigmoid diverticulitis with microperforation, with a few  punctate foci of free air adjacent to the inflamed sigmoid colon. No  free fluid or abscess.  2.  Splenomegaly with the spleen measuring about 25 cm in length.  Large simple cyst in the spleen measuring about 19 cm with mild  peripheral wall calcifications. Consider a follow-up MRI on a  nonemergent basis for definitive characterization.    GILDA REIS MD         SYSTEM ID:  [id number]   MR Abdomen w/o & w Contrast    Narrative    MRI ABDOMEN    CLINICAL HISTORY:  Splenomegaly    TECHNIQUE: MRI of the abdomen without and with intravenous contrast.  Contrast dose: 10 mL Gadavist  Comparison study: CT  of the abdomen earlier today    FINDINGS:  Redemonstration of splenomegaly with the spleen measuring 25 cm in  craniocaudal length. Again seen is a large splenic cyst measuring 18.1  x 15.3 x 20.0 cm (series 701, image 25 and series 501, image 19) with  T1 and T2 hyperintense contents, likely due to presence of  proteinaceous material. This cyst is nonenhancing on postcontrast  subtraction images. Otherwise, normal appearance of the splenic  parenchyma. There is mild mass effect on the greater curvature of the  stomach and the left kidney from the splenomegaly.    A 0.9 cm flash filling hemangioma in the anterior peripheral right  hepatic lobe (series 900, image 145). No hepatic steatosis. No  suspicious lesions in the liver. Normal gallbladder. No biliary ductal  dilatation. The pancreas and adrenal glands are normal. Small simple  cyst in the left kidney and otherwise normal kidneys. Mild  inflammatory changes around the proximal sigmoid colon (series 1200,  image 48), better seen on the CT earlier today, consistent with known  sigmoid diverticulitis. No small bowel or colonic obstruction. No  lymphadenopathy. No ascites.      Impression    IMPRESSION:  1. Splenomegaly. Large splenic cyst with proteinaceous contents  without suspicious associated enhancement. There is mass effect on the  stomach and left kidney from the splenomegaly. If the patient is  symptomatic, surgical consultation on a nonemergent basis can be  considered.  2. Mild acute sigmoid diverticulitis, better assessed on the CT  earlier today.    GILDA REIS MD         SYSTEM ID:  [id number]   XR Chest Port 1 View    Narrative    XR CHEST PORT 1 VIEW 2/14/2023 12:45 PM    HISTORY: COVID-19 infection, dyspnea    COMPARISON: None.      Impression    IMPRESSION: No acute cardiopulmonary process.    GILDA REIS MD         SYSTEM ID:  [id number]       Discharge Medications   Current Discharge Medication List      START taking these medications    Details    amoxicillin-clavulanate (AUGMENTIN) 875-125 MG tablet Take 1 tablet by mouth 2 times daily for 10 days  Qty: 20 tablet, Refills: 0    Associated Diagnoses: Diverticulitis of colon; Colon perforation (H); Health Care Home           Allergies   No Known Allergies

## 2023-02-17 ENCOUNTER — PATIENT OUTREACH (OUTPATIENT)
Dept: CARE COORDINATION | Facility: CLINIC | Age: 43
End: 2023-02-17
Payer: COMMERCIAL

## 2023-02-17 ENCOUNTER — ANCILLARY ORDERS (OUTPATIENT)
Dept: SURGERY | Facility: CLINIC | Age: 43
End: 2023-02-17

## 2023-02-17 DIAGNOSIS — D73.4 SPLENIC CYST: ICD-10-CM

## 2023-02-17 NOTE — PROGRESS NOTES
Clinic Care Coordination Contact  [facility]/Voicemail       Clinical Data: Care Coordinator Outreach  Outreach attempted x 1.  Left message on patient's voicemail with call back information and requested return call.  . Care Coordinator will try to reach patient again in 1-2 business days.    PEPE Santoro  , Care Coordination  [facility]  [phone]  [email or web address]

## 2023-02-20 ENCOUNTER — TELEPHONE (OUTPATIENT)
Dept: OTHER | Facility: CLINIC | Age: 43
End: 2023-02-20
Payer: COMMERCIAL

## 2023-02-20 NOTE — PROGRESS NOTES
Clinic Care Coordination Contact  [facility]/Voicemail       Clinical Data: Care Coordinator Outreach  Outreach attempted x 2.  Left message on patient's voicemail with call back information and requested return call.  . Care Coordinator will try to reach patient again in 3-5 business days.    PEPE Santoro  , Care Coordination  [facility]  [phone]  [email or web address]

## 2023-02-20 NOTE — TELEPHONE ENCOUNTER
Reached out to patient.  He is out of town.  He does not need an appointment to discuss upcoming procedure.  He is unsure who suggested it.  He is declining consult with Dr. Doe.  He will speak with Dr. Doe the day of procedure if he has any questions.    Anca Watters RN  IR nurse clinician  [phone]

## 2023-02-24 NOTE — PROGRESS NOTES
Clinic Care Coordination Contact  No contact since last outreach. [facility] will do no further outreaches.  PEPE Santoro  , Care Coordination  [facility]  [phone]  [email or web address]

## 2023-02-27 RX ORDER — NALOXONE HYDROCHLORIDE 0.4 MG/ML
0.2 INJECTION, SOLUTION INTRAMUSCULAR; INTRAVENOUS; SUBCUTANEOUS
Status: CANCELLED | OUTPATIENT
Start: 2023-02-27

## 2023-02-27 RX ORDER — NALOXONE HYDROCHLORIDE 0.4 MG/ML
0.4 INJECTION, SOLUTION INTRAMUSCULAR; INTRAVENOUS; SUBCUTANEOUS
Status: CANCELLED | OUTPATIENT
Start: 2023-02-27

## 2023-02-27 RX ORDER — FLUMAZENIL 0.1 MG/ML
0.2 INJECTION, SOLUTION INTRAVENOUS
Status: CANCELLED | OUTPATIENT
Start: 2023-02-27

## 2023-02-27 RX ORDER — FENTANYL CITRATE 50 UG/ML
25-50 INJECTION, SOLUTION INTRAMUSCULAR; INTRAVENOUS EVERY 5 MIN PRN
Status: CANCELLED | OUTPATIENT
Start: 2023-02-27

## 2023-02-28 ENCOUNTER — TELEPHONE (OUTPATIENT)
Dept: INTERVENTIONAL RADIOLOGY/VASCULAR | Facility: CLINIC | Age: 43
End: 2023-02-28
Payer: COMMERCIAL

## 2023-03-01 ENCOUNTER — HOSPITAL ENCOUNTER (OUTPATIENT)
Facility: CLINIC | Age: 43
Discharge: HOME | End: 2023-03-01
Admitting: RADIOLOGY
Payer: COMMERCIAL

## 2023-03-01 ENCOUNTER — APPOINTMENT (OUTPATIENT)
Dept: INTERVENTIONAL RADIOLOGY/VASCULAR | Facility: CLINIC | Age: 43
End: 2023-03-01
Attending: SURGERY
Payer: COMMERCIAL

## 2023-03-01 VITALS
TEMPERATURE: 97.8 F | HEIGHT: 70 IN | RESPIRATION RATE: 18 BRPM | DIASTOLIC BLOOD PRESSURE: 89 MMHG | OXYGEN SATURATION: 100 % | WEIGHT: 220 LBS | HEART RATE: 60 BPM | SYSTOLIC BLOOD PRESSURE: 111 MMHG | BODY MASS INDEX: 31.5 KG/M2

## 2023-03-01 DIAGNOSIS — S30.1XXA ABDOMINAL WALL SEROMA: Primary | ICD-10-CM

## 2023-03-01 DIAGNOSIS — D73.4 SPLENIC CYST: ICD-10-CM

## 2023-03-01 LAB
HGB BLD-MCNC: 14.2 G/DL (ref 13.3–17.7)
INR PPP: 1.01 (ref 0.85–1.15)
PLATELET # BLD AUTO: 174 10E3/UL (ref 150–450)

## 2023-03-01 PROCEDURE — C1769 GUIDE WIRE: HCPCS

## 2023-03-01 PROCEDURE — 999N000163 HC STATISTIC SIMPLE TUBE INSERTION/CHARGE, PORT, CATH, FISTULOGRAM

## 2023-03-01 PROCEDURE — 87070 CULTURE OTHR SPECIMN AEROBIC: CPT | Performed by: RADIOLOGY

## 2023-03-01 PROCEDURE — 85610 PROTHROMBIN TIME: CPT | Performed by: PHYSICIAN ASSISTANT

## 2023-03-01 PROCEDURE — 99152 MOD SED SAME PHYS/QHP 5/>YRS: CPT

## 2023-03-01 PROCEDURE — 36591 DRAW BLOOD OFF VENOUS DEVICE: CPT

## 2023-03-01 PROCEDURE — C1729 CATH, DRAINAGE: HCPCS

## 2023-03-01 PROCEDURE — 272N000500 HC NEEDLE CR2

## 2023-03-01 PROCEDURE — 85049 AUTOMATED PLATELET COUNT: CPT | Performed by: PHYSICIAN ASSISTANT

## 2023-03-01 PROCEDURE — 49185 SCLEROTX FLUID COLLECTION: CPT

## 2023-03-01 PROCEDURE — 49405 IMAGE CATH FLUID COLXN VISC: CPT

## 2023-03-01 PROCEDURE — 250N000011 HC RX IP 250 OP 636: Performed by: PHYSICIAN ASSISTANT

## 2023-03-01 PROCEDURE — 85018 HEMOGLOBIN: CPT | Performed by: PHYSICIAN ASSISTANT

## 2023-03-01 PROCEDURE — 250N000009 HC RX 250: Performed by: PHYSICIAN ASSISTANT

## 2023-03-01 PROCEDURE — 36415 COLL VENOUS BLD VENIPUNCTURE: CPT | Performed by: PHYSICIAN ASSISTANT

## 2023-03-01 RX ORDER — CEFAZOLIN SODIUM 2 G/100ML
2 INJECTION, SOLUTION INTRAVENOUS
Status: COMPLETED | OUTPATIENT
Start: 2023-03-01 | End: 2023-03-01

## 2023-03-01 RX ORDER — DOXYCYCLINE 100 MG/10ML
500 INJECTION, POWDER, LYOPHILIZED, FOR SOLUTION INTRAVENOUS ONCE
Status: DISCONTINUED | OUTPATIENT
Start: 2023-03-01 | End: 2023-03-01

## 2023-03-01 RX ORDER — LIDOCAINE 40 MG/G
CREAM TOPICAL
Status: DISCONTINUED | OUTPATIENT
Start: 2023-03-01 | End: 2023-03-01 | Stop reason: HOSPADM

## 2023-03-01 RX ORDER — DOXYCYCLINE 100 MG/10ML
100 INJECTION, POWDER, LYOPHILIZED, FOR SOLUTION INTRAVENOUS ONCE
Status: COMPLETED | OUTPATIENT
Start: 2023-03-01 | End: 2023-03-01

## 2023-03-01 RX ADMIN — DOXYCYCLINE 100 ML: 100 INJECTION, POWDER, LYOPHILIZED, FOR SOLUTION INTRAVENOUS at 09:11

## 2023-03-01 RX ADMIN — CEFAZOLIN SODIUM 2 G: 2 INJECTION, SOLUTION INTRAVENOUS at 08:19

## 2023-03-01 RX ADMIN — LIDOCAINE HYDROCHLORIDE 10 ML: 10 INJECTION, SOLUTION INFILTRATION; PERINEURAL at 08:44

## 2023-03-01 ASSESSMENT — ACTIVITIES OF DAILY LIVING (ADL)
ADLS_ACUITY_SCORE: 35
ADLS_ACUITY_SCORE: 35

## 2023-03-01 NOTE — PROGRESS NOTES
Care Suites Admission Nursing Note    Patient Information  Name: Sonny Deluca  Age: 42 year old  Reason for admission: splenic cyst drain  Care Suites arrival time: 0700    Patient Admission/Assessment   Pre-procedure assessment complete: Yes  If abnormal assessment/labs, provider notified: N/A  NPO: Yes  Medications held per instructions/orders: N/A  Consent: obtained  Patient oriented to room: Yes  Education/questions answered: Yes  Plan/other: Proceed with cyst drain placement - plan for without sedation but per Dr. Conroy will have ready if needed    Discharge Planning  Discharge name/phone number: Julieta spouse [phone]  Overnight post sedation caregiver: Julieta  Discharge location: home    Yodit López RN

## 2023-03-01 NOTE — DISCHARGE INSTRUCTIONS
Abscess Drain Discharge Instructions     After you go home:    You may resume your normal diet  Have an adult stay with you for 6 hours if you received sedation       For 24 hours - due to the sedation you received:  Relax and take it easy  Do NOT make any important or legal decisions  Do NOT drive or operate machines at home or at work  Do NOT drink alcohol    Care of Puncture Site:    For the first 48 hrs, check your puncture site every couple hours while you are awake   Check the tube site twice a day for signs of infection  Keep the dressing around the tube dry  Change the dressing every 2-3 days if it is guaze/tape. If there is a Stay Fix dressing intact - this should be changed weekly.  You may shower but do not get the dressing wet. Place a waterproof cover over the dressing (such as plastic wrap). Change the dressing if it becomes wet.  No tub baths, whirlpools or swimming until the tube is removed     Activity     You may go back to normal activity in 24 hours  Wait 48 hours before lifting, straining, exercise or other strenuous activity    Medicines:    You may resume  medications  For minor pain, you may take Acetaminophen (Tylenol) or Ibuprofen (Advil)                 Call the provider who ordered this procedure if:    Increased pain or a large or growing hard lump around the site  Blood or fluid is draining from the site  The site is red, swollen, hot or tender  Chills or a fever greater than 101 F (38 C)  Pain that is getting worse  Any questions or concerns    Call  911 or go to the Emergency Room if:    Severe pain or trouble breathing  Bleeding that you cannot control    Other Instructions:    If the tube falls out - cover the opening with gauze & tape  Record drainage output amounts & bring sheet to return appointments  Take your temperature daily    If you have questions call:          [facility] Radiology Dept @ [phone]        The provider who performed your procedure was   [facility].

## 2023-03-01 NOTE — IR NOTE
RADIOLOGY POST PROCEDURE NOTE    Patient name: Sonny Deluca  MRN: [id number]  : 1980    Pre-procedure diagnosis: Cyst in LUQ, likely splenic in origin  Post-procedure diagnosis: Same    Procedure Date/Time: 2023  9:30 AM  Procedure: US guided 10 Fr drain placement in the LUQ, possibly splenic in origin.    3 L of brown thick fluid was aspirated.  Sample sent to lab for gram stain and culture, though do not suspect infection.      240 ml of contrast injected (no communication) and 100 ml of doxycycline administered.  Patient to care suites for positioning.      Estimated blood loss: 3 ml  Specimen(s) collected with description: LUQ brown fluid removed.    The patient tolerated the procedure well with no immediate complications.  Significant findings:  Please see above.  See imaging dictation for procedural details.    Provider name: Conner Doe MD  Assistant(s):None

## 2023-03-01 NOTE — PROGRESS NOTES
Care Suites Post Procedure Note    Patient Information  Name: Sonny Deluca  Age: 42 year old    Post Procedure  Time patient returned to Care Suites: 4045  Concerns/abnormal assessment: none at this time  If abnormal assessment, provider notified: N/A  Plan/Other: Call from Dr. Conroy to writing RN with following instructions: Pt to lay on left side 5-10 min, stomach 5-10 min, right side 5-10 min, then RN to aspirate fluid and attach bulb drain. Pt able to discharge after these steps, no sedation given in procedure.    Yodit López, RN

## 2023-03-01 NOTE — PRE-PROCEDURE
GENERAL PRE-PROCEDURE:   Procedure:  Image-guided abdominal drain placement with sclerotherapy with as needed moderate sedation  Date/Time:  3/1/2023 8:01 AM    Written consent obtained?: Yes    Risks and benefits: Risks, benefits and alternatives were discussed    DC Plan: Appropriate discharge home plan in place for patients who are going home after procedure   Consent given by:  Patient  Patient states understanding of procedure being performed: Yes    Patient's understanding of procedure matches consent: Yes    Procedure consent matches procedure scheduled: Yes    Expected level of sedation:  Moderate  Appropriately NPO:  Yes  ASA Class:  1  Mallampati  :  Grade 3- soft palate visible, posterior pharyngeal wall not visible  Lungs:  Lungs clear with good breath sounds bilaterally  Heart:  Normal heart sounds and rate  History & Physical reviewed:  History and physical reviewed and no updates needed  Statement of review:  I have reviewed the lab findings, diagnostic data, medications, and the plan for sedation

## 2023-03-01 NOTE — PROGRESS NOTES
"Care Suites Discharge Nursing Note    Patient Information  Name: Sonny Deluca  Age: 42 year old    Discharge Education:  Discharge instructions reviewed: Yes  Additional education/resources provided: dressing change supplies, supplies to empty drain and a sheet for documenting output and temperature  Patient/patient representative verbalizes understanding: Yes  Patient discharging on new medications: No  Medication education completed: N/A    Discharge Plans:   Discharge location: home  Discharge ride contacted: Yes  Approximate discharge time: 1030    Discharge Criteria:  Discharge criteria met and vital signs stable: Yes    Patient Belongs:  Patient belongings returned to patient: Yes    Page to Dr. Conroy: \"are you able to place order for pt to come back in 3-4 days so he can call scheduling? if not already done. just discharged pt. Thanks!\"    Yodit López RN         "

## 2023-03-01 NOTE — IR NOTE
Interventional Radiology Intra-procedural Nursing Note    Patient Name: Sonny Deluca  Medical Record Number: [id number]  Today's Date: March 1, 2023    Start Time: 840  End of procedure time: 914  Procedure: Image-guided abdominal drain placement with sclerotherapy with as needed moderate sedation  Report given to: Care Suites RN  Time pt departs:  920    Other Notes: Pt into IR suite 2 via cart. Pt awake and alert. To table in supine position. Monitoring equipment applied. VSS. Tele SR. Dr. Doe in room. Time out and procedure started. Pt tolerated procedure well. Debrief with Dr. Doe. ABD drain placed, 3 liters out! Sample sent to lab for cultures and gram stain. Dressing CDI. No complications. Pt transferred back to Care Suites.    Medications:    Lidocaine 1% 10 ml    Juan M Head RN

## 2023-03-06 LAB
BACTERIA FLD CULT: NO GROWTH
GRAM STAIN RESULT: NORMAL
GRAM STAIN RESULT: NORMAL

## 2023-03-07 ENCOUNTER — APPOINTMENT (OUTPATIENT)
Dept: INTERVENTIONAL RADIOLOGY/VASCULAR | Facility: CLINIC | Age: 43
End: 2023-03-07
Attending: RADIOLOGY
Payer: COMMERCIAL

## 2023-03-07 ENCOUNTER — HOSPITAL ENCOUNTER (OUTPATIENT)
Facility: CLINIC | Age: 43
Discharge: HOME | End: 2023-03-07
Admitting: RADIOLOGY
Payer: COMMERCIAL

## 2023-03-07 VITALS
DIASTOLIC BLOOD PRESSURE: 86 MMHG | RESPIRATION RATE: 18 BRPM | HEART RATE: 83 BPM | TEMPERATURE: 97.3 F | OXYGEN SATURATION: 98 % | BODY MASS INDEX: 31.5 KG/M2 | WEIGHT: 220 LBS | SYSTOLIC BLOOD PRESSURE: 120 MMHG | HEIGHT: 70 IN

## 2023-03-07 DIAGNOSIS — S30.1XXA ABDOMINAL WALL SEROMA: ICD-10-CM

## 2023-03-07 PROCEDURE — 20500 NJX SINUS TRACT THERAPEUTIC: CPT

## 2023-03-07 PROCEDURE — 250N000011 HC RX IP 250 OP 636: Performed by: PHYSICIAN ASSISTANT

## 2023-03-07 PROCEDURE — 999N000163 HC STATISTIC SIMPLE TUBE INSERTION/CHARGE, PORT, CATH, FISTULOGRAM

## 2023-03-07 PROCEDURE — 49424 ASSESS CYST CONTRAST INJECT: CPT

## 2023-03-07 RX ORDER — DOXYCYCLINE 100 MG/10ML
100 INJECTION, POWDER, LYOPHILIZED, FOR SOLUTION INTRAVENOUS ONCE
Status: COMPLETED | OUTPATIENT
Start: 2023-03-07 | End: 2023-03-07

## 2023-03-07 RX ORDER — NALOXONE HYDROCHLORIDE 0.4 MG/ML
0.2 INJECTION, SOLUTION INTRAMUSCULAR; INTRAVENOUS; SUBCUTANEOUS
Status: DISCONTINUED | OUTPATIENT
Start: 2023-03-07 | End: 2023-03-07 | Stop reason: HOSPADM

## 2023-03-07 RX ORDER — NALOXONE HYDROCHLORIDE 0.4 MG/ML
0.4 INJECTION, SOLUTION INTRAMUSCULAR; INTRAVENOUS; SUBCUTANEOUS
Status: DISCONTINUED | OUTPATIENT
Start: 2023-03-07 | End: 2023-03-07 | Stop reason: HOSPADM

## 2023-03-07 RX ORDER — LIDOCAINE 40 MG/G
CREAM TOPICAL
Status: DISCONTINUED | OUTPATIENT
Start: 2023-03-07 | End: 2023-03-07 | Stop reason: HOSPADM

## 2023-03-07 RX ADMIN — DOXYCYCLINE 100 ML: 100 INJECTION, POWDER, LYOPHILIZED, FOR SOLUTION INTRAVENOUS at 08:35

## 2023-03-07 ASSESSMENT — ACTIVITIES OF DAILY LIVING (ADL)
ADLS_ACUITY_SCORE: 35
ADLS_ACUITY_SCORE: 35

## 2023-03-07 NOTE — PRE-PROCEDURE
GENERAL PRE-PROCEDURE:   Procedure:  Sclerotherapy    Written consent obtained?: Yes    Risks and benefits: Risks, benefits and alternatives were discussed    Consent given by:  Patient  Patient states understanding of procedure being performed: Yes    Patient's understanding of procedure matches consent: Yes    Procedure consent matches procedure scheduled: Yes    Appropriately NPO:  Yes  ASA Class:  2  History & Physical reviewed:  History and physical reviewed and no updates needed  Statement of review:  I have reviewed the lab findings, diagnostic data, medications, and the plan for sedation    60mL output each of last three days into LUQ splenic cyst drain  Denies N/V/F/C    Mp Stark PA-C  Interventional Radiology  [phone] (IR)  *[phone] ([facility] Office)

## 2023-03-07 NOTE — PROGRESS NOTES
Care Suites Admission Nursing Note    Patient Information  Name: Sonny Deluca  Age: 42 year old  Reason for admission: Sclerotherapy  Care Suites arrival time: 0700    Visitor Information  Name: none  Informed of visitor restrictions: N/A  2 visitor allowed per patient   Visitor must wear a mask    Patient Admission/Assessment   Pre-procedure assessment complete: Yes  If abnormal assessment/labs, provider notified: N/A  NPO: Yes  Medications held per instructions/orders: Yes  Consent: obtained  If applicable, pregnancy test status: obtained  Patient oriented to room: Yes  Education/questions answered: Yes  Plan/other: getting prepped for their procedure    Discharge Planning  Discharge name/phone number: none, no sedation given  Overnight post sedation caregiver: none, no sedation given  Discharge location: home    Liv Elder RN

## 2023-03-07 NOTE — IR NOTE
Interventional Radiology Intra-procedural Nursing Note    Patient Name: Sonny Deluca  Medical Record Number: [id number]  Today's Date: March 7, 2023    Start Time: 1029  End of procedure time: 1036  Procedure: Sclerotherapy  Report given to: Care Suites RN  Time pt departs:  840    Other Notes: Pt into IR suite 1 via cart. Pt awake and alert. To table in supine position. Monitoring equipment applied. VSS. Tele SR. Dr. Doe in room. Time out and procedure started. Pt tolerated procedure well. Debrief with Dr. Doe. Dressing CDI. No complications. Pt transferred back to Care Suites.    Medications:    Doxycycline 1000 mg in 100 ml    Juan M Head RN

## 2023-03-07 NOTE — DISCHARGE INSTRUCTIONS
Tube Care Discharge Instructions     After you go home:    You may resume your normal diet  Drink plenty of fluids, especially water    Care of Tube Site:    Check the tube site twice a day for signs of infection.    Keep the dressing around the tube dry  Change the dressing every 2-3 days if it is gauze/tape. If there is a Stay Fix dressing intact - this should be changed weekly.  Change the dressing if it becomes wet or dirty  You may shower but do not get the dressing wet. Place a waterproof cover over the dressing (such as plastic wrap).   No tub baths, whirlpools or swimming until the tube is removed     Activity:    You may go back to normal activity in 24 hours  Wait 48 hours before lifting, straining, exercise or other strenuous activity    Medicines:    You may resume all medications  For minor pain, you may take Acetaminophen (Tylenol) or Ibuprofen (Advil)               Call the provider who ordered this procedure if:    The site is red, swollen, hot or tender  There is foul-smelling drainage from the tube site  You have pain that is getting worse or that does not improve with pain medication  You have chills or a fever greater than 101 F (38 C)  Fluid stops draining or is leaking around the tube onto your skin  The tube falls out   Any questions or concerns    Call  911 or go to the Emergency Room if you have:    Severe pain or trouble breathing  Bleeding that you cannot control    Other Instructions:    If the tube falls out - cover the opening with gauze & tape  Record drainage output amounts & bring sheet to return appointments  Take your temperature daily      If you have questions call:          [facility] Radiology Dept @ [phone]      The provider who performed your procedure was _________________.           Drainage Tube Home Care Instruction

## 2023-03-07 NOTE — PROGRESS NOTES
Care Suites Post Procedure Note    Patient Information  Name: Sonny Deluca  Age: 42 year old    Post Procedure  Time patient returned to Care Suites: 0845  Concerns/abnormal assessment: No  If abnormal assessment, provider notified: N/A  Plan/Other: turning routine, aspirate fluid then attach new ZAINA and discharge to home.    Care Suites Discharge Nursing Note    Discharge Education:  Discharge instructions reviewed: Yes  Additional education/resources provided: all questions answered  Patient/patient representative verbalizes understanding: Yes  Patient discharging on new medications: No  Medication education completed: N/A    Discharge Plans:   Discharge location: home  Discharge ride contacted: N/A  Approximate discharge time: 1025    Discharge Criteria:  Discharge criteria met and vital signs stable: Yes    Patient Belongs:  Patient belongings returned to patient: Yes    Dalila Kaplan RN

## 2023-03-09 ENCOUNTER — TELEPHONE (OUTPATIENT)
Dept: INTERVENTIONAL RADIOLOGY/VASCULAR | Facility: CLINIC | Age: 43
End: 2023-03-09
Payer: COMMERCIAL

## 2023-03-14 ENCOUNTER — HOSPITAL ENCOUNTER (OUTPATIENT)
Facility: CLINIC | Age: 43
Discharge: HOME | End: 2023-03-14
Admitting: RADIOLOGY
Payer: COMMERCIAL

## 2023-03-14 ENCOUNTER — APPOINTMENT (OUTPATIENT)
Dept: INTERVENTIONAL RADIOLOGY/VASCULAR | Facility: CLINIC | Age: 43
End: 2023-03-14
Attending: RADIOLOGY
Payer: COMMERCIAL

## 2023-03-14 VITALS
RESPIRATION RATE: 18 BRPM | TEMPERATURE: 97.5 F | OXYGEN SATURATION: 97 % | BODY MASS INDEX: 31.5 KG/M2 | SYSTOLIC BLOOD PRESSURE: 116 MMHG | HEIGHT: 70 IN | DIASTOLIC BLOOD PRESSURE: 77 MMHG | WEIGHT: 220 LBS | HEART RATE: 81 BPM

## 2023-03-14 DIAGNOSIS — D73.4 SPLENIC CYST: ICD-10-CM

## 2023-03-14 PROCEDURE — 76080 X-RAY EXAM OF FISTULA: CPT

## 2023-03-14 PROCEDURE — 999N000163 HC STATISTIC SIMPLE TUBE INSERTION/CHARGE, PORT, CATH, FISTULOGRAM

## 2023-03-14 PROCEDURE — 272N000004 HC RX 272: Performed by: NURSE PRACTITIONER

## 2023-03-14 PROCEDURE — 250N000009 HC RX 250: Performed by: RADIOLOGY

## 2023-03-14 PROCEDURE — 49424 ASSESS CYST CONTRAST INJECT: CPT

## 2023-03-14 RX ORDER — ALCOHOL 1 ML/ML
30 INJECTION, SOLUTION PERCUTANEOUS ONCE
Status: COMPLETED | OUTPATIENT
Start: 2023-03-14 | End: 2023-03-14

## 2023-03-14 RX ADMIN — ALCOHOL 30 ML: 1 INJECTION, SOLUTION PERCUTANEOUS at 15:17

## 2023-03-14 RX ADMIN — LIDOCAINE HYDROCHLORIDE 20 ML: 10 INJECTION, SOLUTION INFILTRATION; PERINEURAL at 14:42

## 2023-03-14 ASSESSMENT — ACTIVITIES OF DAILY LIVING (ADL)
ADLS_ACUITY_SCORE: 35
ADLS_ACUITY_SCORE: 35

## 2023-03-14 NOTE — PROGRESS NOTES
1445 Pt returned from IR. Volume to inject -- 30 cc per Dr Vasquez. Sclerosing therapy done per DO. See MAR. Pt to change position every 5 minutes. Lidocaine 1% -- 20 cc injected in IR.  1505 Lidocaine removed after 20 min. Dehydrated/Absolute Alcohol 98% -- 30 cc injected.  1535 Absolute Alcohol removed after 30 min.   1540 Gabrielle BeckerION at bedside to speak with pt.  1550 ZAINA bulb suction applied to drain tube. Taped securely. Pt to return in 1 week. Pt denies pain.   1610 Pt discharged per ambulatory. All personal belongings sent with pt.

## 2023-03-14 NOTE — PRE-PROCEDURE
GENERAL PRE-PROCEDURE:   Procedure:  Sinogram of splenic cyst and sclerotherapy  Date/Time:  3/14/2023 1:30 PM    Written consent obtained?: Yes    Risks and benefits: Risks, benefits and alternatives were discussed    Consent given by:  Patient  Patient states understanding of procedure being performed: Yes    Patient's understanding of procedure matches consent: Yes    Procedure consent matches procedure scheduled: Yes    Appropriately NPO:  Yes    Sonny has had 30-40 mL out daily from the splenic drain since his last visit in . He has no c/o pain. In general he has less pressure in his LUQ and can eat more.     He feels he may have accidentally tugged on his drain so would like that checked, which won't be a problem since the drain will be injected under X ray and positioning will be seen.     He will have his drain injected with dehydrated alcohol today. All questions answered.     Total time: 20 minutes    Thanks [facility] Interventional Radiology CNP ([phone]) (phone [phone])

## 2023-03-14 NOTE — IR NOTE
Interventional Radiology Intra-procedural Nursing Note    Patient Name: Sonny Deluca  Medical Record Number: [id number]  Today's Date: March 14, 2023    Procedure: Sinogram with local anesthesia    Start time: 1439  End time: 1443  Report provided to: Margie ALVAREZ  Patient depart time and location: 250 to CS9    Note: Patient entered Interventional Radiology Suite number 2 via cart. Patient awake, alert and oriented. Assisted onto procedural table in supine position. Prepped and draped.  Dr. Vasuqez in room. Time out and procedure started  Procedure well tolerated by patient without complications. Procedure end with debrief by Dr. Vasquez  Gauze and tegaderm dressing applied to abdomen.    Administered medication totals:  Lidocaine 1% 20 mL Intradermal  No Sedation

## 2023-03-14 NOTE — PROGRESS NOTES
Care Suites Admission Nursing Note    Patient Information  Name: Sonny Deluca  Age: 42 year old  Reason for admission: sclerotherapy  Care Suites arrival time: 1300    Visitor Information  Name: BERTRAND      Patient Admission/Assessment   Pre-procedure assessment complete: Yes  If abnormal assessment/labs, provider notified: N/A  NPO: Yes  Medications held per instructions/orders: Yes  Consent: obtained  If applicable, pregnancy test status: deferred  Patient oriented to room: Yes  Education/questions answered: Yes  Plan/other: SCHEDULED FOR 1400    Discharge Planning  Discharge name/phone number:    Overnight post sedation caregiver: bertrand  Discharge location: home    Sunita Reynoso RN

## 2023-03-20 ENCOUNTER — TELEPHONE (OUTPATIENT)
Dept: INTERVENTIONAL RADIOLOGY/VASCULAR | Facility: CLINIC | Age: 43
End: 2023-03-20
Payer: COMMERCIAL

## 2023-03-21 ENCOUNTER — HOSPITAL ENCOUNTER (OUTPATIENT)
Facility: CLINIC | Age: 43
Discharge: HOME | End: 2023-03-21
Admitting: RADIOLOGY
Payer: COMMERCIAL

## 2023-03-21 ENCOUNTER — APPOINTMENT (OUTPATIENT)
Dept: INTERVENTIONAL RADIOLOGY/VASCULAR | Facility: CLINIC | Age: 43
End: 2023-03-21
Attending: NURSE PRACTITIONER
Payer: COMMERCIAL

## 2023-03-21 VITALS
DIASTOLIC BLOOD PRESSURE: 79 MMHG | SYSTOLIC BLOOD PRESSURE: 120 MMHG | HEART RATE: 77 BPM | RESPIRATION RATE: 18 BRPM | OXYGEN SATURATION: 97 % | TEMPERATURE: 97.6 F

## 2023-03-21 PROCEDURE — 272N000004 HC RX 272: Performed by: NURSE PRACTITIONER

## 2023-03-21 PROCEDURE — 49424 ASSESS CYST CONTRAST INJECT: CPT

## 2023-03-21 PROCEDURE — 20500 NJX SINUS TRACT THERAPEUTIC: CPT

## 2023-03-21 PROCEDURE — 250N000009 HC RX 250: Performed by: NURSE PRACTITIONER

## 2023-03-21 PROCEDURE — 999N000163 HC STATISTIC SIMPLE TUBE INSERTION/CHARGE, PORT, CATH, FISTULOGRAM

## 2023-03-21 RX ORDER — ALCOHOL 1 ML/ML
30 INJECTION, SOLUTION PERCUTANEOUS ONCE
Status: COMPLETED | OUTPATIENT
Start: 2023-03-21 | End: 2023-03-21

## 2023-03-21 RX ADMIN — SODIUM CHLORIDE 100 ML: 9 INJECTION, SOLUTION INTRAVENOUS at 09:07

## 2023-03-21 RX ADMIN — ALCOHOL 30 ML: 1 INJECTION, SOLUTION PERCUTANEOUS at 09:18

## 2023-03-21 ASSESSMENT — ACTIVITIES OF DAILY LIVING (ADL): ADLS_ACUITY_SCORE: 35

## 2023-03-21 NOTE — PROGRESS NOTES
Care Suites Post Procedure Note    Patient Information  Name: Sonny Deluca  Age: 42 year old    Post Procedure  Time patient returned to Care Suites: 0928  Concerns/abnormal assessment: No  If abnormal assessment, provider notified: N/A  Plan/Other: Per orders    Patient will turn every 10 minutes (per orders). Gabrielle, NP, will meet with patient prior to discharge and removal of fluid.     Citlali Locke RN

## 2023-03-21 NOTE — IR NOTE
Patient Name: Sonny Deluca  Medical Record Number: [id number]  Today's Date: 3/21/2023    Procedure: Sinogram  Proceduralist: Dr. Rajput  Pathology present: no    Procedure Start:0913  Procedure end: 0921  Sedation medications administered: Lidocaine 10 ml's.    Report given to: ELOY Monsivais RN  : no    Other Notes: Pt will require 1 bedrest post procedure. Pt arrived to IR room 2 from cs 19. Consent reviewed. Pt denies any questions or concerns regarding procedure. Pt positioned supine and monitored per protocol. Pt tolerated procedure without any noted complications.

## 2023-03-21 NOTE — DISCHARGE INSTRUCTIONS
Abscess Drain Discharge Instructions     After you go home:    You may resume your normal diet  Have an adult stay with you for 6 hours if you received sedation      Care of Puncture Site:    For the first 48 hrs, check your puncture site every couple hours while you are awake   Check the tube site twice a day for signs of infection  Keep the dressing around the tube dry  Change the dressing every 2-3 days if it is guaze/tape.  You may shower but do not get the dressing wet. Place a waterproof cover over the dressing (such as plastic wrap). Change the dressing if it becomes wet.  No tub baths, whirlpools or swimming until the tube is removed     Activity     You may go back to normal activity in 24 hours  Wait 48 hours before lifting, straining, exercise or other strenuous activity    Medicines:    You may resume  medications  Resume your Warfarin/Coumadin at your regular dose today. Follow up with your provider to have your INR rechecked  Resume your Platelet Inhibitors and Aspirin tomorrow at your regular dose  For minor pain, you may take Acetaminophen (Tylenol) or Ibuprofen (Advil)                 Call the provider who ordered this procedure if:    Increased pain or a large or growing hard lump around the site  Blood or fluid is draining from the site  The site is red, swollen, hot or tender  Chills or a fever greater than 101 F (38 C)  Pain that is getting worse  Any questions or concerns    Call  911 or go to the Emergency Room if:    Severe pain or trouble breathing  Bleeding that you cannot control    Other Instructions:    If the tube falls out - cover the opening with gauze & tape  Record drainage output amounts & bring sheet to return appointments  Take your temperature daily    If you have questions call:          Rad[facility] Radiology Dept @ [phone]        The provider who performed your procedure was _________________.

## 2023-03-21 NOTE — PROGRESS NOTES
Care Suites Discharge Nursing Note    Patient Information  Name: Sonny Deluca  Age: 42 year old    Discharge Education:  Discharge instructions reviewed: Yes  Additional education/resources provided: No.  Patient/patient representative verbalizes understanding: Yes  Patient discharging on new medications: N/A  Medication education completed: N/A    Discharge Plans:   Discharge location: home  Discharge ride contacted: N/A  Approximate discharge time: 1027  Discharge Criteria:  Discharge criteria met and vital signs stable: Yes    VSS stable for discharge. 50ml of fluid taken out of drain prior to discharge.     Completed AVS/discharge instructions with patient prior to patient leaving. Patient stated that he had no questions. Gabrielle, NP, met and talked with patient prior to discharge.     Patient Belongs:  Patient belongings returned to patient: Yes    Citlali Locke RN

## 2023-03-21 NOTE — PROCEDURES
[facility]    Procedure: Splenic cyst sclerotherapy.     Date/Time: 3/21/2023 9:27 AM  Performed by: Yesenia Rajput DO  Authorized by: Yesenia Rajput DO       UNIVERSAL PROTOCOL   Site Marked: Yes  Prior Images Obtained and Reviewed:  Yes  Required items: Required blood products, implants, devices and special equipment available    Patient identity confirmed:  Verbally with patient, arm band, provided demographic data and hospital-assigned identification number  Patient was reevaluated immediately before administering moderate or deep sedation or anesthesia  Confirmation Checklist:  Patient's identity using two indicators, relevant allergies, procedure was appropriate and matched the consent or emergent situation and correct equipment/implants were available  Time out: Immediately prior to the procedure a time out was called    Universal Protocol: the Joint Commission Universal Protocol was followed    Preparation: Patient was prepped and draped in usual sterile fashion       ANESTHESIA    Anesthesia: Local infiltration  Local Anesthetic:  Lidocaine 1% without epinephrine      SEDATION    Patient Sedated: No    See dictated procedure note for full details.  Findings: Splenic cyst sinogram shows the cyst is relatively unchanged in size when compared to the initial imaging. ETOH was administered into the drain for sclerotherapy.     Specimens: none    Complications: None    Condition: Stable    Plan: Continue routine sclerotherapy. Could consider 3x's per week alcohol sclerotherapy and/or 3x's per day sclerotherapy at home with betadine. Consider repeat imaging in 3 months.  Realistically given the size of this cyst sclerotherapy is not likely to be successful       PROCEDURE    Patient Tolerance:  Patient tolerated the procedure well with no immediate complications  Length of time physician/provider present for 1:1 monitoring during sedation: 0

## 2023-03-21 NOTE — PROGRESS NOTES
Care Suites Admission Nursing Note    Patient Information  Name: Sonny Deluca  Age: 42 year old  Reason for admission: IR sinogram and sclerotherapy  Care Suites arrival time: 0830    Patient Admission/Assessment   Pre-procedure assessment complete: Yes  If abnormal assessment/labs, provider notified: N/A  NPO: N/A  Medications held per instructions/orders: N/A  Consent: obtained  Patient oriented to room: Yes  Education/questions answered: Yes  Plan/other: Scheduled at 0930, pt ready early, RN will notify IR they can take pt early if able    Discharge Planning  Discharge name/phone number: pt drove self and will drive self home  Overnight post sedation caregiver: n/a - no sedation  Discharge location: home    Yodit López RN

## 2023-03-21 NOTE — PRE-PROCEDURE
GENERAL PRE-PROCEDURE:   Procedure:  Right drain sinogram with sclerotherapy  Date/Time:  3/21/2023 8:55 AM    Written consent obtained?: Yes    Risks and benefits: Risks, benefits and alternatives were discussed    Consent given by:  Patient  Patient states understanding of procedure being performed: Yes    Patient's understanding of procedure matches consent: Yes    Procedure consent matches procedure scheduled: Yes    Appropriately NPO:  Not NPO, but emergent condition outweighs risk  Outputs this past week have been ~ 35 mL daily, No c/o pain or new issues, just the general inconvenience of the drain which he is tolerating well.     Reviewed plan of possible CT scan to evaluate the fluid resolution as outputs continue to drop. Possibly next week but will check with Dr Rajput.     Total time: 15 minutes    Thanks [facility] Interventional Radiology CNP ([phone]) (phone [phone])

## 2023-03-23 ENCOUNTER — ANCILLARY ORDERS (OUTPATIENT)
Dept: OTHER | Facility: CLINIC | Age: 43
End: 2023-03-23

## 2023-03-23 ENCOUNTER — ANCILLARY ORDERS (OUTPATIENT)
Dept: INTERVENTIONAL RADIOLOGY/VASCULAR | Facility: CLINIC | Age: 43
End: 2023-03-23

## 2023-03-23 ENCOUNTER — TELEPHONE (OUTPATIENT)
Dept: INTERVENTIONAL RADIOLOGY/VASCULAR | Facility: CLINIC | Age: 43
End: 2023-03-23
Payer: COMMERCIAL

## 2023-03-23 DIAGNOSIS — D73.4 SPLENIC CYST: ICD-10-CM

## 2023-03-23 NOTE — TELEPHONE ENCOUNTER
INTERVENTIONAL RADIOLOGY INSTRUCTIONS     You are scheduled for an upcoming procedure in the   Interventional Radiology Department at [facility].       Date: Thursday March 30      Procedure: Sinogram     Address: [facility]                 [address] [address]     [address] is located on [address], across the street from hospital.  There is a skyway attached to this ramp that will direct you to the patient check in area.       Check into the [facility] L[facility] at: 08:30 am         Two visitors may accompany you to your procedure.      Please take all of your medications as prescribed with a sip of water in the am, unless you are contacted by a nurse from our department to hold them.       If you have any questions, please call the IR nurses at [phone].     Thank you!    Lynne AGUILA  Interventional Radiology Intake Nurse Coordinator  [phone]

## 2023-03-29 ENCOUNTER — TELEPHONE (OUTPATIENT)
Dept: INTERVENTIONAL RADIOLOGY/VASCULAR | Facility: CLINIC | Age: 43
End: 2023-03-29
Payer: COMMERCIAL

## 2023-04-04 ENCOUNTER — APPOINTMENT (OUTPATIENT)
Dept: INTERVENTIONAL RADIOLOGY/VASCULAR | Facility: CLINIC | Age: 43
End: 2023-04-04
Attending: NURSE PRACTITIONER
Payer: COMMERCIAL

## 2023-04-04 ENCOUNTER — HOSPITAL ENCOUNTER (OUTPATIENT)
Facility: CLINIC | Age: 43
Discharge: HOME | End: 2023-04-04
Admitting: RADIOLOGY
Payer: COMMERCIAL

## 2023-04-04 VITALS
TEMPERATURE: 97 F | HEART RATE: 85 BPM | OXYGEN SATURATION: 93 % | RESPIRATION RATE: 18 BRPM | SYSTOLIC BLOOD PRESSURE: 119 MMHG | DIASTOLIC BLOOD PRESSURE: 74 MMHG

## 2023-04-04 DIAGNOSIS — D73.4 SPLENIC CYST: ICD-10-CM

## 2023-04-04 PROCEDURE — 250N000011 HC RX IP 250 OP 636: Performed by: NURSE PRACTITIONER

## 2023-04-04 PROCEDURE — 49424 ASSESS CYST CONTRAST INJECT: CPT

## 2023-04-04 PROCEDURE — 20500 NJX SINUS TRACT THERAPEUTIC: CPT

## 2023-04-04 PROCEDURE — 999N000163 HC STATISTIC SIMPLE TUBE INSERTION/CHARGE, PORT, CATH, FISTULOGRAM

## 2023-04-04 RX ORDER — ALCOHOL 1 ML/ML
1-50 INJECTION, SOLUTION PERCUTANEOUS ONCE
Status: CANCELLED | OUTPATIENT
Start: 2023-04-04 | End: 2023-04-04

## 2023-04-04 RX ORDER — DOXYCYCLINE 100 MG/10ML
100 INJECTION, POWDER, LYOPHILIZED, FOR SOLUTION INTRAVENOUS ONCE
Status: COMPLETED | OUTPATIENT
Start: 2023-04-04 | End: 2023-04-04

## 2023-04-04 RX ADMIN — DOXYCYCLINE 100 ML: 100 INJECTION, POWDER, LYOPHILIZED, FOR SOLUTION INTRAVENOUS at 08:55

## 2023-04-04 ASSESSMENT — ACTIVITIES OF DAILY LIVING (ADL)
ADLS_ACUITY_SCORE: 35
ADLS_ACUITY_SCORE: 35

## 2023-04-04 NOTE — PROGRESS NOTES
patient Name:  Sonny Deluca    Medical Record Number: [id number]  Today's Date:  April 4, 2023  Procedure: splenic drain sinogram and possible slerotherapy  Start Time: 0849  End of procedure time: 0856    Report given to: care suite RN   Time pt departs:  0900       Notes:       Pt transferred to IR table. Prepped and draped appropriately. Timeout recorded.       No sedation    Pt tolerated procedure well. Pt transferred back to care suite. All questions answered.      Aleksey Nava, RN, CCRN, SCRN

## 2023-04-04 NOTE — PRE-PROCEDURE
GENERAL PRE-PROCEDURE:   Procedure:  Splenic drain sinogram, possible sclerotherapy   Date/Time:  4/4/2023 8:07 AM    Written consent obtained?: Yes    Risks and benefits: Risks, benefits and alternatives were discussed    Consent given by:  Patient  Patient states understanding of procedure being performed: Yes    Patient's understanding of procedure matches consent: Yes    Procedure consent matches procedure scheduled: Yes    Appropriately NPO:  Yes    Sonny's outputs have been 30-40 mL daily. Fluid is serous with brown debris. He is feeling well. No new pain, fevers or GI issues. He is scheduled for a colonoscopy next week and wondered if there would be any issues having the drain, which there shouldn't be.     Thanks [facility] Interventional Radiology CNP ([phone]) (phone [phone])

## 2023-04-04 NOTE — PROGRESS NOTES
Care Suites Admission Nursing Note    Patient Information  Name: Sonny Deluca  Age: 42 year old  Reason for admission: IR sinogram  Care Suites arrival time: 0735        Patient Admission/Assessment   Pre-procedure assessment complete: Yes  If abnormal assessment/labs, provider notified: N/A  NPO: Yes  Medications held per instructions/orders: N/A  Consent: obtained  If applicable, pregnancy test status: deferred  Patient oriented to room: Yes  Education/questions answered: Yes  Plan/other: proceed with procedure        Angie Luna RN

## 2023-04-04 NOTE — PROGRESS NOTES
Care Suites Post Procedure Note    Patient Information  Name: Sonny Deluca  Age: 42 year old    Post Procedure  Time patient returned to Care Suites: 0900  Concerns/abnormal assessment: pt concerned about stop cock attached to ZAINA drain. Call placed to [facility] NP who removed stop cock.  If abnormal assessment, provider notified: N/A  Plan/Other: turning every 10 minutes then fluid extracted. ZAINA bulb to suction, discharge to home.    Care Suites Discharge Nursing Note    Discharge Education:  Discharge instructions reviewed: N/A  Additional education/resources provided: all questions answered  Patient/patient representative verbalizes understanding: Yes  Patient discharging on new medications: No  Medication education completed: N/A    Discharge Plans:   Discharge location: home  Discharge ride contacted: N/A  Approximate discharge time: 1010    Discharge Criteria:  Discharge criteria met and vital signs stable: Yes    Patient Belongs:  Patient belongings returned to patient: Yes    Dalila Kaplan RN

## 2023-04-05 ENCOUNTER — ANCILLARY ORDERS (OUTPATIENT)
Dept: OTHER | Facility: CLINIC | Age: 43
End: 2023-04-05

## 2023-04-05 ENCOUNTER — ANCILLARY ORDERS (OUTPATIENT)
Dept: INTERVENTIONAL RADIOLOGY/VASCULAR | Facility: CLINIC | Age: 43
End: 2023-04-05

## 2023-04-05 DIAGNOSIS — D73.4 SPLENIC CYST: ICD-10-CM

## 2023-04-07 ENCOUNTER — TELEPHONE (OUTPATIENT)
Dept: INTERVENTIONAL RADIOLOGY/VASCULAR | Facility: CLINIC | Age: 43
End: 2023-04-07
Payer: COMMERCIAL

## 2023-04-11 ENCOUNTER — HOSPITAL ENCOUNTER (OUTPATIENT)
Facility: CLINIC | Age: 43
Discharge: HOME | End: 2023-04-11
Admitting: RADIOLOGY
Payer: COMMERCIAL

## 2023-04-11 ENCOUNTER — APPOINTMENT (OUTPATIENT)
Dept: INTERVENTIONAL RADIOLOGY/VASCULAR | Facility: CLINIC | Age: 43
End: 2023-04-11
Attending: NURSE PRACTITIONER
Payer: COMMERCIAL

## 2023-04-11 VITALS
OXYGEN SATURATION: 99 % | SYSTOLIC BLOOD PRESSURE: 124 MMHG | RESPIRATION RATE: 18 BRPM | DIASTOLIC BLOOD PRESSURE: 78 MMHG | HEART RATE: 73 BPM | TEMPERATURE: 98.9 F

## 2023-04-11 DIAGNOSIS — D73.4 SPLENIC CYST: ICD-10-CM

## 2023-04-11 PROCEDURE — 250N000011 HC RX IP 250 OP 636

## 2023-04-11 PROCEDURE — 999N000012 HC STATISTIC ANGIOGRAM, STENT, VERTEBRO PLASTY

## 2023-04-11 PROCEDURE — 76080 X-RAY EXAM OF FISTULA: CPT

## 2023-04-11 RX ORDER — DOXYCYCLINE 100 MG/10ML
100 INJECTION, POWDER, LYOPHILIZED, FOR SOLUTION INTRAVENOUS ONCE
Status: DISCONTINUED | OUTPATIENT
Start: 2023-04-11 | End: 2023-04-11

## 2023-04-11 RX ORDER — DOXYCYCLINE 100 MG/10ML
100 INJECTION, POWDER, LYOPHILIZED, FOR SOLUTION INTRAVENOUS ONCE
Status: COMPLETED | OUTPATIENT
Start: 2023-04-11 | End: 2023-04-11

## 2023-04-11 RX ADMIN — DOXYCYCLINE 100 MG: 100 INJECTION, POWDER, LYOPHILIZED, FOR SOLUTION INTRAVENOUS at 12:18

## 2023-04-11 ASSESSMENT — ACTIVITIES OF DAILY LIVING (ADL): ADLS_ACUITY_SCORE: 35

## 2023-04-11 NOTE — PROGRESS NOTES
Care Suites Discharge Nursing Note    Patient Information  Name: Sonny Deluca  Age: 42 year old    Discharge Education:  Discharge instructions reviewed: Yes  Additional education/resources provided: Per radiology  Patient/patient representative verbalizes understanding: Yes  Patient discharging on new medications: No  Medication education completed: Yes    Discharge Plans:   Discharge location: home  Discharge ride contacted: N/A  Approximate discharge time: 1330    Discharge Criteria:  Discharge criteria met and vital signs stable: Yes    Patient Belongs:  Patient belongings returned to patient: Yes    Pancho Mckeon RN

## 2023-04-11 NOTE — DISCHARGE INSTRUCTIONS
Sinogram Discharge Instructions     After you go home:    You may resume your normal diet  Drink plenty of fluids, especially water  Have an adult stay with you for 6 hours if you received sedation    Care of Tube Site:    For the first 48 hrs, check your puncture site every couple hours while you are awake   Check the tube site twice a day for signs of infection  Keep the dressing around the tube dry  Change the dressing every 2-3 days if it is gauze/tape. If there is a Stay Fix dressing intact - this should be changed weekly.  Change the dressing if it becomes wet or dirty  You may shower but do not get the dressing wet. Place a waterproof cover over the dressing (such as plastic wrap).   No tub baths, whirlpools or swimming until the tube is removed     Activity:    You may go back to normal activity in 24 hours  Wait 48 hours before lifting, straining, exercise or other strenuous activity    Medicines:    You may resume all medications  Resume your Warfarin/Coumadin at your regular dose today. Follow up with your provider to have your INR rechecked  Resume your Platelet Inhibitors and Aspirin tomorrow at your regular dose  For minor pain, you may take Acetaminophen (Tylenol) or Ibuprofen (Advil)               Call the provider who ordered this procedure if:    The site is red, swollen, hot or tender  There is foul-smelling drainage from the tube site  You have pain that is getting worse or that does not improve with pain medication  You have chills or a fever greater than 101 F (38 C)  Fluid stops draining or is leaking around the tube onto your skin  The tube falls out   Any questions or concerns    Call  911 or go to the Emergency Room if:    Severe pain or trouble breathing  Bleeding that you cannot control    Other Instructions:    If the tube falls out - cover the opening with gauze & tape  Record drainage output amounts & bring sheet to return appointments  Take your temperature daily    If you have  questions call:          Radha [facility] Radiology Dept @ [phone]      The provider who performed your procedure was _________________.

## 2023-04-11 NOTE — PROGRESS NOTES
Care Suites Admission Nursing Note    Patient Information  Name: Sonny Deluca  Age: 42 year old  Reason for admission: [facility]  Care Suites arrival time: 1150      Patient Admission/Assessment   Pre-procedure assessment complete: Yes  If abnormal assessment/labs, provider notified: N/A  NPO: N/A  Medications held per instructions/orders: N/A  Consent: deferred  If applicable, pregnancy test status: deferred  Patient oriented to room: Yes  Education/questions answered: Yes  Plan/other: sinogram    Discharge Planning  Discharge name/phone number:self  Overnight post sedation caregiver: self  Discharge location: home    Rangel Willams RN

## 2023-04-11 NOTE — IR NOTE
Patient Name: Sonny Deluca  Medical Record Number: [id number]  Today's Date: 4/11/2023    Procedure: Sinogram  Proceduralist: Dr. Phillips  Pathology present: no    Procedure Start: 1217  Procedure end: 1220  Sedation medications administered: none  Report given to: ELOY Multani RN  : no    Other Notes: Pt will require 0 bedrest post procedure. Pt arrived to IR room 2 from cs 8. Consent reviewed. Pt denies any questions or concerns regarding procedure. Pt positioned supine and monitored per protocol. Pt tolerated procedure without any noted complications.

## 2023-04-11 NOTE — PRE-PROCEDURE
GENERAL PRE-PROCEDURE:   Procedure:  Sinogram of splenic cyst through drain, sclerotherapy  Date/Time:  4/11/2023 12:07 PM    Written consent obtained?: Yes    Risks and benefits: Risks, benefits and alternatives were discussed    Consent given by:  Patient  Patient states understanding of procedure being performed: Yes    Patient's understanding of procedure matches consent: Yes    Procedure consent matches procedure scheduled: Yes    Appropriately NPO:  Yes    Daily output has been 30 mL daily. No questions at this time.     Thanks [facility] Interventional Radiology CNP ([phone]) (phone [phone])

## 2023-04-12 ENCOUNTER — ANCILLARY ORDERS (OUTPATIENT)
Dept: OTHER | Facility: CLINIC | Age: 43
End: 2023-04-12

## 2023-04-12 ENCOUNTER — ANCILLARY ORDERS (OUTPATIENT)
Dept: INTERVENTIONAL RADIOLOGY/VASCULAR | Facility: CLINIC | Age: 43
End: 2023-04-12

## 2023-04-12 DIAGNOSIS — D73.4 SPLENIC CYST: ICD-10-CM

## 2023-04-19 ENCOUNTER — TELEPHONE (OUTPATIENT)
Dept: INTERVENTIONAL RADIOLOGY/VASCULAR | Facility: CLINIC | Age: 43
End: 2023-04-19
Payer: COMMERCIAL

## 2023-04-21 ENCOUNTER — HOSPITAL ENCOUNTER (OUTPATIENT)
Facility: CLINIC | Age: 43
Discharge: HOME | End: 2023-04-21
Admitting: RADIOLOGY
Payer: COMMERCIAL

## 2023-04-21 ENCOUNTER — APPOINTMENT (OUTPATIENT)
Dept: INTERVENTIONAL RADIOLOGY/VASCULAR | Facility: CLINIC | Age: 43
End: 2023-04-21
Attending: NURSE PRACTITIONER
Payer: COMMERCIAL

## 2023-04-21 VITALS
TEMPERATURE: 97 F | BODY MASS INDEX: 30.06 KG/M2 | OXYGEN SATURATION: 96 % | HEIGHT: 70 IN | SYSTOLIC BLOOD PRESSURE: 118 MMHG | DIASTOLIC BLOOD PRESSURE: 74 MMHG | HEART RATE: 81 BPM | RESPIRATION RATE: 16 BRPM | WEIGHT: 210 LBS

## 2023-04-21 DIAGNOSIS — D73.4 SPLENIC CYST: ICD-10-CM

## 2023-04-21 PROCEDURE — 49185 SCLEROTX FLUID COLLECTION: CPT

## 2023-04-21 PROCEDURE — 250N000011 HC RX IP 250 OP 636

## 2023-04-21 PROCEDURE — 258N000003 HC RX IP 258 OP 636

## 2023-04-21 PROCEDURE — 20500 NJX SINUS TRACT THERAPEUTIC: CPT

## 2023-04-21 PROCEDURE — 999N000163 HC STATISTIC SIMPLE TUBE INSERTION/CHARGE, PORT, CATH, FISTULOGRAM

## 2023-04-21 RX ORDER — DOXYCYCLINE 100 MG/10ML
100 INJECTION, POWDER, LYOPHILIZED, FOR SOLUTION INTRAVENOUS ONCE
Status: DISCONTINUED | OUTPATIENT
Start: 2023-04-21 | End: 2023-04-21

## 2023-04-21 RX ADMIN — DOXYCYCLINE: 100 INJECTION, POWDER, LYOPHILIZED, FOR SOLUTION INTRAVENOUS at 10:41

## 2023-04-21 ASSESSMENT — ACTIVITIES OF DAILY LIVING (ADL)
ADLS_ACUITY_SCORE: 35
ADLS_ACUITY_SCORE: 35

## 2023-04-21 NOTE — PROGRESS NOTES
Sonny here today for last sclerotherapy. 50 mL of doxycycline injected which were removed in care suites without pain or issues. Drain removal requested from Dr Conroy.     After reviewing procedure and answering questions the drain was removed intact without pain or complicates. Gauze and tape placed over the site.     Discharge instructions with dressing changes reviewed with Sonny and are also in the AVS.     Total Time: 20 minutes    Thanks, Gabrielle [facility] Interventional Radiology CNP ([phone]) (phone [phone])

## 2023-04-21 NOTE — PROGRESS NOTES
Care Suites Post Procedure Note    Patient Information  Name: Sonny Deluca  Age: 42 year old    Post Procedure  Time patient returned to Care Suites: 1050.  Denies c/o. Site D/I.  Pt will be doing the 10 min rotations ans verbalizes understanding of this.  See flow sheet.  Concerns/abnormal assessment: none  If abnormal assessment, provider notified: N/A  Plan/Other: Dr Conroy will come later to remove tube.    Sunita Hardin RN

## 2023-04-21 NOTE — DISCHARGE INSTRUCTIONS
Tube Removal Discharge Instructions     After you go home:    You may resume your normal diet    Care of Insertion Site: Drain removed 4/21/23    You may change the dressing daily, but more often if dressing becomes wet or dirty. It is not unusual to have drainage from the opening until the site is completely healed.   You may remove the dressing when the site is completely healed   You may shower tomorrow  No tub baths, whirlpools or swimming until your puncture site has fully healed    Activity:    You may go back to normal activity in 24 hours  Wait 48 hours before lifting, straining, exercise or other strenuous activity    Medicines:    You may resume all medications  For minor pain, you may take Acetaminophen (Tylenol) or Ibuprofen (Advil)               Call the provider who ordered this procedure if:    The site is red, swollen, hot or tender  There is foul-smelling drainage from the tube site  You have pain that is getting worse or that does not improve with pain medication  You have chills or a fever greater than 101 F (38 C)  If the leaking continues for more than 3 days  Any questions or concerns    Call  911 or go to the Emergency Room if you have:    Severe pain or trouble breathing  Bleeding that you cannot control      If you have questions call:          [facility] Radiology Dept @ [phone]      The provider who performed your procedure was Dr Conroy.

## 2023-04-21 NOTE — PROGRESS NOTES
Care Suites Discharge Nursing Note    Patient Information  Name: Sonny Deluca  Age: 42 year old    Discharge Education:  Discharge instructions reviewed: Yes  Additional education/resources provided: n/a  Patient/patient representative verbalizes understanding: Yes  Patient discharging on new medications: no  Medication education completed: N/A    Discharge Plans:   Discharge location: home  Discharge ride contacted: Yes  Approximate discharge time: 1200    Discharge Criteria:  Discharge criteria met and vital signs stable: Yes    Patient Belongs:  Patient belongings returned to patient: Yes    Yesenia Fregoso RN

## 2023-04-21 NOTE — IR NOTE
Interventional Radiology Intra-procedural Nursing Note    Patient Name: Sonny Deluca  Medical Record Number: [id number]  Today's Date: April 21, 2023    Procedure: Sinogram, Splenic cyst sclerotherapy  Start time: 1038  End time: 1043  Report provided to: ELOY RN  Patient depart time and location: 1050 to CS 12    Note: Patient entered Interventional Radiology Suite number 2 via cart. Patient awake, alert and oriented. Assisted onto procedural table in supine position. Prepped and draped.  Dr. Doe in room. Time out and procedure started.     Procedure well tolerated by patient without complications. Procedure end with debrief by .  . Gauze and tegaderm dressing applied to IR drain site.    Administered medication totals:  50 mg Doxyclyline

## 2023-04-21 NOTE — PROGRESS NOTES
Care Suites Admission Nursing Note    Patient Information  Name: Sonny Deluca  Age: 42 year old  Reason for admission: Sinogram  Care Suites arrival time: 0940        Patient Admission/Assessment   Pre-procedure assessment complete: Yes  If abnormal assessment/labs, provider notified: N/A  NPO: N/A  Medications held per instructions/orders: N/A  Consent: deferred  If applicable, pregnancy test status: n/a  Patient oriented to room: Yes  Education/questions answered: Yes  Plan/other: Called Gabrielle with pt's room #    Discharge Planning    Discharge location: home    Sunita Hardin RN

## 2023-04-21 NOTE — IR NOTE
RADIOLOGY POST PROCEDURE NOTE    Patient name: Sonny Deluca  MRN: [id number]  : 1980    Pre-procedure diagnosis: LUQ drain and sclerotherapy  Post-procedure diagnosis: Same    Procedure Date/Time: 2023  11:12 AM  Procedure:    Tube injected with 30 ml of dilute contrast.  US used to evaluate LUQ and no fluid collection identified.  50 ml of doxycycline placed into the collection and held in place.  WIll bring to Care Suites for multiple positioning and will remove sclerosant and the catheter before patient is discharged.      Estimated blood loss: None  Specimen(s) collected with description: none.  Drain to be removed today prior to discharge.    The patient tolerated the procedure well with no immediate complications.  Significant findings:  Please see above.    See imaging dictation for procedural details.    Provider name: Conner Doe MD  Assistant(s):None

## 2023-04-21 NOTE — PRE-PROCEDURE
GENERAL PRE-PROCEDURE:   Procedure:  Sinogram, Splenic cyst sclerotherapy  Date/Time:  4/21/2023 10:00 AM    Written consent obtained?: Yes    Risks and benefits: Risks, benefits and alternatives were discussed    Consent given by:  Patient  Patient states understanding of procedure being performed: Yes    Patient's understanding of procedure matches consent: Yes    Procedure consent matches procedure scheduled: Yes    Appropriately NPO:  Yes  Patient has continued with 30-35 mL out daily. No other changes    Total time: 15 minutes    Thanks [facility] Interventional Radiology CNP ([phone]) (phone [phone])

## 2023-06-02 ENCOUNTER — HEALTH MAINTENANCE LETTER (OUTPATIENT)
Age: 43
End: 2023-06-02

## 2024-06-05 ENCOUNTER — TELEPHONE (OUTPATIENT)
Dept: OTHER | Facility: CLINIC | Age: 44
End: 2024-06-05
Payer: COMMERCIAL

## 2024-06-05 NOTE — TELEPHONE ENCOUNTER
Patient called, he is concerned regarding large cyst.  It has been over a year since it was treated.  He is concern that it is developing again.  He never had any symptoms with the cyst so imaging is the only way he will know if its there.  Discussed I will reach out to Dr. Doe to see if CT abdomen should be ordered.  Anca Watters RN  IR nurse clinician  [phone]

## 2024-06-30 ENCOUNTER — HEALTH MAINTENANCE LETTER (OUTPATIENT)
Age: 44
End: 2024-06-30

## 2024-07-01 ENCOUNTER — TELEPHONE (OUTPATIENT)
Dept: OTHER | Facility: CLINIC | Age: 44
End: 2024-07-01
Payer: COMMERCIAL

## 2024-07-01 DIAGNOSIS — S30.1XXA ABDOMINAL WALL SEROMA: Primary | ICD-10-CM

## 2024-07-01 NOTE — TELEPHONE ENCOUNTER
Discussed with Dr. Doe, proceed with CT abdomen/pelvis.  Will call patient with results.  Order placed.  Gave patient central scheduling number.  Anca Watters RN  IR nurse clinician  [phone]

## 2024-07-09 ENCOUNTER — HOSPITAL ENCOUNTER (OUTPATIENT)
Dept: CT IMAGING | Facility: CLINIC | Age: 44
Discharge: HOME | End: 2024-07-09
Attending: RADIOLOGY | Admitting: RADIOLOGY
Payer: COMMERCIAL

## 2024-07-09 DIAGNOSIS — S30.1XXA ABDOMINAL WALL SEROMA: ICD-10-CM

## 2024-07-09 PROCEDURE — 250N000011 HC RX IP 250 OP 636: Performed by: RADIOLOGY

## 2024-07-09 PROCEDURE — 74177 CT ABD & PELVIS W/CONTRAST: CPT

## 2024-07-09 PROCEDURE — 250N000009 HC RX 250: Performed by: RADIOLOGY

## 2024-07-09 RX ORDER — IOPAMIDOL 755 MG/ML
103 INJECTION, SOLUTION INTRAVASCULAR ONCE
Status: COMPLETED | OUTPATIENT
Start: 2024-07-09 | End: 2024-07-09

## 2024-07-09 RX ADMIN — IOPAMIDOL 103 ML: 755 INJECTION, SOLUTION INTRAVENOUS at 06:59

## 2024-07-09 RX ADMIN — SODIUM CHLORIDE 69 ML: 9 INJECTION, SOLUTION INTRAVENOUS at 06:59

## 2024-07-15 ENCOUNTER — TELEPHONE (OUTPATIENT)
Dept: OTHER | Facility: CLINIC | Age: 44
End: 2024-07-15
Payer: COMMERCIAL

## 2024-07-15 NOTE — TELEPHONE ENCOUNTER
Contacted patient..  Left VM  Need to review CT scan with Dr. Doe.  He is on vacation this week.  Anca Watters RN  IR nurse clinician  [phone]

## 2024-07-22 ENCOUNTER — TELEPHONE (OUTPATIENT)
Dept: OTHER | Facility: CLINIC | Age: 44
End: 2024-07-22
Payer: COMMERCIAL

## 2024-07-22 NOTE — TELEPHONE ENCOUNTER
Contacted patient regarding CT scan.  Reviewed with Dr. Doe.  Left message  Anca Watters RN  IR nurse clinician  [phone]

## 2024-07-30 DIAGNOSIS — D73.4 SPLENIC CYST: Primary | ICD-10-CM

## 2024-07-30 NOTE — TELEPHONE ENCOUNTER
Patient returned phone call. Discussed recent CT findings.  Gave patient options: aspiration with sclerosing ( although unlikely to be successful) per Dr. Doe, or consult with Dr. Simpson to discuss other options.  Patient is requesting consult Dr. Simpson (did consult in Feb 2023) while in patient.  Placed referral to Dr. Simpson.  Patient is currently asymptomatic.  Anca Watters RN  IR nurse clinician  [phone]

## 2024-09-11 ENCOUNTER — OFFICE VISIT (OUTPATIENT)
Dept: SURGERY | Facility: CLINIC | Age: 44
End: 2024-09-11
Attending: RADIOLOGY
Payer: COMMERCIAL

## 2024-09-11 ENCOUNTER — TELEPHONE (OUTPATIENT)
Dept: SURGERY | Facility: CLINIC | Age: 44
End: 2024-09-11

## 2024-09-11 VITALS
DIASTOLIC BLOOD PRESSURE: 74 MMHG | OXYGEN SATURATION: 98 % | HEIGHT: 70 IN | SYSTOLIC BLOOD PRESSURE: 90 MMHG | WEIGHT: 205 LBS | BODY MASS INDEX: 29.35 KG/M2 | HEART RATE: 66 BPM

## 2024-09-11 DIAGNOSIS — D73.4 SPLENIC CYST: ICD-10-CM

## 2024-09-11 PROCEDURE — 99204 OFFICE O/P NEW MOD 45 MIN: CPT | Performed by: SURGERY

## 2024-09-11 NOTE — Clinical Note
"I did try to talk him into watching it.  The CT done recently was just done because he wondered if \"it was still there.\"  His symptoms are mild and very intermittent.  He would like it intervened on, so I\"m gonna unroof it, open it into his peritoneal cavity and pack it with omentum."

## 2024-09-11 NOTE — TELEPHONE ENCOUNTER
Type of surgery: robotic splenic cyst fenestration  Location of surgery: [facility] OR  Date and time of surgery: 10/22/24 7:30am  Surgeon: Dr Centeno  Pre-Op Appt Date: pt to schedule  Post-Op Appt Date: pt to schedule   Packet sent out: Yes  Pre-cert/Authorization completed:  Not Applicable  Date: 9/11/24

## 2024-09-12 NOTE — PROGRESS NOTES
Surgery Consultation, [facility], GIANNI Centeno MD    Sonny Deluca MRN# [id number]   YOB: 1980 Age: 44 year old     PCP:  Tonya Paredes [phone]    Chief Complaint:  Large splenic cyst    Pt was seen in consultation from Tonya Paredes.    History of Present Illness:  Sonny Deluca is a 44 year old male who presented with a recurrent large splenic cyst. He has had IR attempt to drain an sclerose it in the past and it has now recurred.  He has occasional early satiety and when he bends over it can cause some pressure in his upper abdomen.  It otherwise is asymptomatic.  It being there, however, does cause him some anxiety and he wants to discuss options to deal with it.     This was first discovered when he was admitted in February 2023 for diverticulitis as it appeared incidentally on a CT.    He was set up as an outpatient through IR by Dr. Simpson to have it drained and sclerosed.  They did keep a catheter in it and did treat it numerous times with sclerosant.      PMH:  Sonny Deluca  has a past medical history of NO ACTIVE PROBLEMS.  PSH:  Sonny Deluca  has a past surgical history that includes no history of surgery; IR Skin Subq/Seroma Abscess Drain (3/1/2023); IR Sinogram Injection Therapeutic (3/7/2023); IR Sinogram Injection Therapeutic (3/14/2023); IR Sinogram Injection Therapeutic (3/21/2023); IR Sinogram Injection Diagnostic (4/4/2023); IR Sinogram Injection Diagnostic (4/11/2023); and IR Sinogram Injection Diagnostic (4/21/2023).    Home medications and allergies reviewed.    Social History:  Sonny Deluca  reports that he has never smoked. He has never used smokeless tobacco. He reports that he does not drink alcohol and does not use drugs.  Family History:  Sonny Deluca family history includes Alcohol/Drug in his father; Cancer in his paternal grandfather; Depression in his mother; Family History Negative in his sister.    ROS:  The 10  "point Review of Systems is negative other than noted in the HPI.    Physical Exam:  Blood pressure 90/74, pulse 66, height 1.778 m (5' 10\"), weight 93 kg (205 lb), SpO2 98%.  205 lbs 0 oz    Patient has a pleasant affect and communicates well.   Pupils equal round and reactive to light.   No cervical lymphadenopathy or thyromegaly.   Lung fields clear, breathing comfortably.   Heart normal sinus rhythm.  No murmurs rubs or gallops.  Abdomen soft, nontender, nondistended.  On inspiration I can feel a large mass pushing down from his left subcostal area.   Skin warm, dry.  No obvious rashes or lesions.    All new lab and imaging data was reviewed.   CT scan reviewed personally and with the patient.  Large upper pole fluid filled cystic structure in the spleen.  This displaces most of the left upper quadrant organs including the stomach, kidney, colon     Assessment and Plan: Sonny Deluca is a 44 year old male with a large splenic cyst.  1.  I discussed three options: 1) Nothing and continued observation, 2) Cyst fenestration, 3) Splenectomy  2)  Observation, given that this is relatively asymptomatic (only occasional early satiety and abdominal pressure when bending or turning certain ways) is a viable option.  Some additional worries about this would be spontaneous or incited (MVA, blow to the abdomen) rupture.  It, of course, avoids surgery and the risks inherent to that.    3) I discussed splenectomy next.  This would definitely remove the cyst and remove the risk of recurrence.  I discussed life without a spleen.  This focused mainly on the risk of post-splenectomy sepsis and the vaccines that would required to avoid that.  It would also require a larger incision (which I usually do lower in the abdomen) to remove the spleen in its entirety.  I would need to drain the cyst first in order to make it small enough to remove through a reasonable incision.  4)  I discussed the option of removing the cysts roof and " "removing its fluid and packing it with omentum, essentially fenestrating it.  This would preserve the spleen.  We discussed the possibility of recurrence.  The portion \"unroofed\" would be sent to pathology.  He could need a drain for a bit.  I discussed doing this could lead to bleeding that would make us have to proceed with splenectomy.  He accepts that risk.  If it recurs splenectomy could be done to remove it completely.     5)  In this discussion he would like to proceed with fenestration.  He would like to keep his spleen if possible but would like to intervene on this large cyst as it does make him anxious.  I discussed that procedure with him.  I discussed its pre-operative, vanessa-operative and post-operative course.  I discussed risks and benefits.  Risks include, but are not limited to: bleeding which could lead to splenectomy, infection, recurrence of the cyst, VTE (I am holding prophylaxis given bleeding risk due to the spleen), conversion to open.  He agrees to proceed with a robotic splenic cyst fenestration.  I spent 45 minutes either with the patient, reviewing his available past records, labs, imaging, or discussing with other physicians his care.  Over 50% of the time was spent in direct education of the patient.        Jh Centeno M.D.  [facility]  [phone]    Please route or send letter to:  Primary Care Provider (PCP) and Referring Provider  "

## 2024-10-04 NOTE — PATIENT INSTRUCTIONS
How to Take Your Medication Before Surgery  Preoperative Medication Instructions   Antiplatelet or Anticoagulation Medication Instructions   - Patient is on no antiplatelet or anticoagulation medications.    Additional Medication Instructions   - Herbal medications and vitamins: DO NOT TAKE 7 days prior to surgery.   - ibuprofen (Advil, Motrin): DO NOT TAKE 1 day before surgery.        Patient Education   Preparing for Your Surgery  For Adults  Getting started  In most cases, a nurse will call to review your health history and instructions. They will give you an arrival time based on your scheduled surgery time. Please be ready to share:  Your doctor's clinic name and phone number  Your medical, surgical, and anesthesia history  A list of allergies and sensitivities  A list of medicines, including herbal treatments and over-the-counter drugs  Whether the patient has a legal guardian (ask how to send us the papers in advance)  Note: You may not receive a call if you were seen at our PAC (Preoperative Assessment Center).  Please tell us if you're pregnant--or if there's any chance you might be pregnant. Some surgeries may injure a fetus (unborn baby), so they require a pregnancy test. Surgeries that are safe for a fetus don't always need a test, and you can choose whether to have one.   Preparing for surgery  Within 10 to 30 days of surgery: Have a pre-op exam (sometimes called an H&P, or History and Physical). This can be done at a clinic or pre-operative center.  If you're having a , you may not need this exam. Talk to your care team.  At your pre-op exam, talk to your care team about all medicines you take. (This includes CBD oil and any drugs, such as THC, marijuana, and other forms of cannabis.) If you need to stop any medicine before surgery, ask when to start taking it again.  This is for your safety. Many medicines and drugs can make you bleed too much during surgery. Some change how well surgery  (anesthesia) drugs work.  Call your insurance company to let them know you're having surgery. (If you don't have insurance, call [phone].)  Call your clinic if there's any change in your health. This includes a scrape or scratch near the surgery site, or any signs of a cold (sore throat, runny nose, cough, rash, fever).  Eating and drinking guidelines  For your safety: Unless your surgeon tells you otherwise, follow the guidelines below.  Eat and drink as normal until 8 hours before you arrive for surgery. After that, no food or milk. You can spit out gum when you arrive.  Drink clear liquids until 2 hours before you arrive. These are liquids you can see through, like water, Gatorade, and Propel Water. They also include plain black coffee and tea (no cream or milk).  No alcohol for 24 hours before you arrive. The night before surgery, stop any drinks that contain THC.  If your care team tells you to take medicine on the morning of surgery, it's okay to take it with a sip of water. No other medicines or drugs are allowed (including CBD oil)--follow your care team's instructions.  If you have questions the day of surgery, call your hospital or surgery center.   Preventing infection  Shower or bathe the night before and the morning of surgery. Follow the instructions your clinic gave you. (If no instructions, use regular soap.)  Don't shave or clip hair near your surgery site. We'll remove the hair if needed.  Don't smoke or vape the morning of surgery. No chewing tobacco for 6 hours before you arrive. A nicotine patch is okay. You may spit out nicotine gum when you arrive.  For some surgeries, the surgeon will tell you to fully quit smoking and nicotine.  We will make every effort to keep you safe from infection. We will:  Clean our hands often with soap and water (or an alcohol-based hand rub).  Clean the skin at your surgery site with a special soap that kills germs.  Give you a special gown to keep you warm.  (Cold raises the risk of infection.)  Wear hair covers, masks, gowns, and gloves during surgery.  Give antibiotic medicine, if prescribed. Not all surgeries need this medicine.  What to bring on the day of surgery  Photo ID and insurance card  Copy of your health care directive, if you have one  Glasses and hearing aids (bring cases)  You can't wear contacts during surgery  Inhaler and eye drops, if you use them (tell us about these when you arrive)  CPAP machine or breathing device, if you use them  A few personal items, if spending the night  If you have . . .  A pacemaker, ICD (cardiac defibrillator), or other implant: Bring the ID card.  An implanted stimulator: Bring the remote control.  A legal guardian: Bring a copy of the certified (court-stamped) guardianship papers.  Please remove any jewelry, including body piercings. Leave jewelry and other valuables at home.  If you're going home the day of surgery  You must have a responsible adult drive you home. They should stay with you overnight as well.  If you don't have someone to stay with you, and you aren't safe to go home alone, we may keep you overnight. Insurance often won't pay for this.  After surgery  If it's hard to control your pain or you need more pain medicine, please call your surgeon's office.  Questions?   If you have any questions for your care team, list them here:   ____________________________________________________________________________________________________________________________________________________________________________________________________________________________________________________________  For informational purposes only. Not to replace the advice of your health care provider. Copyright   2003, 2019 [facility]. All rights reserved. Clinically reviewed by Rick Jordan MD. [facility] [id number] - REV 08/24.

## 2024-10-07 ENCOUNTER — OFFICE VISIT (OUTPATIENT)
Dept: FAMILY MEDICINE | Facility: CLINIC | Age: 44
End: 2024-10-07

## 2024-10-07 VITALS
DIASTOLIC BLOOD PRESSURE: 74 MMHG | SYSTOLIC BLOOD PRESSURE: 108 MMHG | OXYGEN SATURATION: 98 % | WEIGHT: 207 LBS | HEIGHT: 71 IN | BODY MASS INDEX: 28.98 KG/M2 | HEART RATE: 59 BPM

## 2024-10-07 DIAGNOSIS — Z23 IMMUNIZATION DUE: ICD-10-CM

## 2024-10-07 DIAGNOSIS — Z01.818 PREOP GENERAL PHYSICAL EXAM: Primary | ICD-10-CM

## 2024-10-07 DIAGNOSIS — D73.4 SPLENIC CYST: ICD-10-CM

## 2024-10-07 LAB
% GRANULOCYTES: 64.4 % (ref 42.2–75.2)
HCT VFR BLD AUTO: 41.9 % (ref 39–51)
HEMOGLOBIN: 14.3 G/DL (ref 13.4–17.5)
LYMPHOCYTES NFR BLD AUTO: 27.7 % (ref 20.5–51.1)
MCH RBC QN AUTO: 27.4 PG (ref 27–31)
MCHC RBC AUTO-ENTMCNC: 34 G/DL (ref 33–37)
MCV RBC AUTO: 80.5 FL (ref 80–100)
MONOCYTES NFR BLD AUTO: 7.9 % (ref 1.7–9.3)
PLATELET # BLD AUTO: 193 K/UL (ref 140–450)
RBC # BLD AUTO: 5.21 X10/CMM (ref 4.2–5.9)
WBC # BLD AUTO: 5.5 X10/CMM (ref 3.8–11)

## 2024-10-07 PROCEDURE — 90480 ADMN SARSCOV2 VAC 1/ONLY CMP: CPT

## 2024-10-07 PROCEDURE — 91320 SARSCV2 VAC 30MCG TRS-SUC IM: CPT

## 2024-10-07 PROCEDURE — 90471 IMMUNIZATION ADMIN: CPT

## 2024-10-07 PROCEDURE — 85025 COMPLETE CBC W/AUTO DIFF WBC: CPT

## 2024-10-07 PROCEDURE — 90661 CCIIV3 VAC ABX FR 0.5 ML IM: CPT

## 2024-10-07 PROCEDURE — 99203 OFFICE O/P NEW LOW 30 MIN: CPT | Mod: 25

## 2024-10-07 PROCEDURE — 36415 COLL VENOUS BLD VENIPUNCTURE: CPT

## 2024-10-07 NOTE — PROGRESS NOTES
Preoperative Evaluation  [facility]  [address] [place] [address]  Phone: [phone]  Fax: [phone]  Primary Provider: AUDIE Gomes CNP  Pre-op Performing Provider: AUDIE Gomes CNP  Oct 7, 2024             10/7/2024   Surgical Information   What procedure is being done? Robotic splenic cyst fenestration   Facility or Hospital where procedure/surgery will be performed: [facility]   Who is doing the procedure / surgery? Jh Centeno MD   Date of surgery / procedure: 10/22/2024   Time of surgery / procedure: TBD   Where do you plan to recover after surgery? at home with family        Fax number for surgical facility: Note does not need to be faxed, will be available electronically in Epic.    Assessment & Plan     The proposed surgical procedure is considered LOW risk.    Preop general physical exam  - CBC with Diff/Plt ([facility])  - VENOUS COLLECTION    Splenic cyst  -reason for procedure  - CBC with Diff/Plt ([facility])  - VENOUS COLLECTION    Immunization due  - VACCINE ADMINISTRATION, INITIAL  - CBC with Diff/Plt ([facility])  - VENOUS COLLECTION              - No identified additional risk factors other than previously addressed    Antiplatelet or Anticoagulation Medication Instructions   - Patient is on no antiplatelet or anticoagulation medications.    Additional Medication Instructions   - Herbal medications and vitamins: DO NOT TAKE 14 days prior to surgery.   - ibuprofen (Advil, Motrin): DO NOT TAKE 1 day before surgery.     Recommendation  Approval given to proceed with proposed procedure, without further diagnostic evaluation.    Blessing Dennis is a 44 year old, presenting for the following:  Pre-Op Exam (Preop exam for cyst removal. Scheduled for 10/22/2024 at [facility] with Dr. Centeno.)        HPI related to upcoming procedure:   Splenic Cyst fenestration with Dr. Centeno  Sometimes early satiety and pressure when bends forward - more so this cyst  causes patient anxiety and he wants intervention       10/7/2024   Pre-Op Questionnaire   Have you ever had a heart attack or stroke? No   Have you ever had surgery on your heart or blood vessels, such as a stent placement, a coronary artery bypass, or surgery on an artery in your head, neck, heart, or legs? No   Do you have chest pain with activity? No   Do you have a history of heart failure? No   Do you currently have a cold, bronchitis or symptoms of other infection? No   Do you have a cough, shortness of breath, or wheezing? No   Do you or anyone in your family have previous history of blood clots? No   Do you or does anyone in your family have a serious bleeding problem such as prolonged bleeding following surgeries or cuts? No   Have you ever had problems with anemia or been told to take iron pills? No   Have you had any abnormal blood loss such as black, tarry or bloody stools? No   Have you ever had a blood transfusion? No   Are you willing to have a blood transfusion if it is medically needed before, during, or after your surgery? Yes   Have you or any of your relatives ever had problems with anesthesia? No   Do you have sleep apnea, excessive snoring or daytime drowsiness? No   Do you have any artifical heart valves or other implanted medical devices like a pacemaker, defibrillator, or continuous glucose monitor? No   Do you have artificial joints? No   Are you allergic to latex? No        Health Care Directive  Patient does not have a Health Care Directive or Living Will: did not discuss    Preoperative Review of    reviewed - no record of controlled substances prescribed.          Patient Active Problem List    Diagnosis Date Noted    Diverticulitis of colon 02/14/2023     Priority: Medium      Past Medical History:   Diagnosis Date    NO ACTIVE PROBLEMS      Past Surgical History:   Procedure Laterality Date    IR SINOGRAM INJECTION DIAGNOSTIC  4/4/2023    IR SINOGRAM INJECTION DIAGNOSTIC   "4/11/2023    IR SINOGRAM INJECTION DIAGNOSTIC  4/21/2023    IR SINOGRAM INJECTION THERAPEUTIC  3/7/2023    IR SINOGRAM INJECTION THERAPEUTIC  3/14/2023    IR SINOGRAM INJECTION THERAPEUTIC  3/21/2023    IR SKIN SUBQ/SEROMA ABSCESS DRAIN  3/1/2023    NO HISTORY OF SURGERY       No current outpatient medications on file.       No Known Allergies     Social History     Tobacco Use    Smoking status: Never    Smokeless tobacco: Never   Substance Use Topics    Alcohol use: No     Family History   Problem Relation Age of Onset    Alcohol/Drug Father     Depression Mother         medicated    Cancer Paternal Grandfather         stomach    Family History Negative Sister      History   Drug Use No             Review of Systems  Constitutional, HEENT, cardiovascular, pulmonary, gi and gu systems are negative, except as otherwise noted.    Objective    Ht 1.797 m (5' 10.75\")   Wt 93.9 kg (207 lb)   BMI 29.08 kg/m     Estimated body mass index is 29.08 kg/m  as calculated from the following:    Height as of this encounter: 1.797 m (5' 10.75\").    Weight as of this encounter: 93.9 kg (207 lb).  Physical Exam  GENERAL: alert and no distress  EYES: Eyes grossly normal to inspection, PERRL and conjunctivae and sclerae normal  HENT: ear canals and TM's normal, nose and mouth without ulcers or lesions  NECK: no adenopathy, no asymmetry, masses, or scars  RESP: lungs clear to auscultation - no rales, rhonchi or wheezes  CV: regular rate and rhythm, normal S1 S2, no S3 or S4, no murmur, click or rub, no peripheral edema  ABDOMEN: soft, nontender, no hepatosplenomegaly, no masses and bowel sounds normal  MS: no gross musculoskeletal defects noted, no edema  SKIN: no suspicious lesions or rashes  NEURO: Normal strength and tone, mentation intact and speech normal  PSYCH: mentation appears normal, affect normal/bright    No results for input(s): \"HGB\", \"PLT\", \"INR\", \"NA\", \"POTASSIUM\", \"CR\", \"A1C\" in the last 8760 hours. "     Diagnostics  Labs pending at this time.  Results will be reviewed when available.   No EKG required for low risk surgery (cataract, skin procedure, breast biopsy, etc).    Revised Cardiac Risk Index (RCRI)  The patient has the following serious cardiovascular risks for perioperative complications:   - No serious cardiac risks = 0 points     RCRI Interpretation: 0 points: Class I (very low risk - 0.4% complication rate)         Signed Electronically by: AUDIE Gomes CNP  A copy of this evaluation report is provided to the requesting physician.

## 2024-10-07 NOTE — PROGRESS NOTES
Preoperative Evaluation  [facility]  [address] [place] [address]  Phone: [phone]  Fax: [phone]  Primary Provider: AUDIE Gomes CNP  Pre-op Performing Provider: AUDIE Gomes CNP  Oct 7, 2024     {ROOMER review and update patient entered surgical information if needed :894325}  { After Pre-op is completed, use lists to pull documentation into note Link to complete Pre-Op    :168558}        10/7/2024   Surgical Information   What procedure is being done? Robotic splenic cyst fenestration   Facility or Hospital where procedure/surgery will be performed: [facility]   Who is doing the procedure / surgery? Jh Centeno MD   Date of surgery / procedure: 10/22/2024   Time of surgery / procedure: TBD   Where do you plan to recover after surgery? at home with family      Fax number for surgical facility: ***    Assessment & Plan     The proposed surgical procedure is considered LOW risk.    Preop general physical exam  ***    Splenic cyst  ***    Immunization due  ***  - VACCINE ADMINISTRATION, INITIAL              - No identified additional risk factors other than previously addressed    {REQUIRED Document medication hold or pull data from patient instructions:298798}    Recommendation  {IMPORTANT - Approval:[id number]:}    Blessing Dennis is a 44 year old, presenting for the following:  Pre-Op Exam (Preop exam for cyst removal. Scheduled for 10/22/2024 at [facility] with Dr. Centeno.)    Splenic Cyst fenestration with Dr. Centeno  Sometimes early satiety and pressure when bends forward - more so this cyst causes patient anxiety and he wants intervention     HPI related to upcoming procedure: ***  {Pull Pre-Op Questionnaire into note after flowsheet completed:509474}  Health Care Directive  Patient does not have a Health Care Directive or Living Will: {ADVANCE_DIRECTIVE_STATUS:497946}    Preoperative Review of   {Mnpmpreport:152717}  {Review MNPMP for  "all patients per [facility] Profile:476570}    {Chronic problem details (Optional) :795271}    Patient Active Problem List    Diagnosis Date Noted    Diverticulitis of colon 02/14/2023     Priority: Medium      Past Medical History:   Diagnosis Date    NO ACTIVE PROBLEMS      Past Surgical History:   Procedure Laterality Date    IR SINOGRAM INJECTION DIAGNOSTIC  4/4/2023    IR SINOGRAM INJECTION DIAGNOSTIC  4/11/2023    IR SINOGRAM INJECTION DIAGNOSTIC  4/21/2023    IR SINOGRAM INJECTION THERAPEUTIC  3/7/2023    IR SINOGRAM INJECTION THERAPEUTIC  3/14/2023    IR SINOGRAM INJECTION THERAPEUTIC  3/21/2023    IR SKIN SUBQ/SEROMA ABSCESS DRAIN  3/1/2023    NO HISTORY OF SURGERY       No current outpatient medications on file.       No Known Allergies     Social History     Tobacco Use    Smoking status: Never    Smokeless tobacco: Never   Substance Use Topics    Alcohol use: No     Family History   Problem Relation Age of Onset    Alcohol/Drug Father     Depression Mother         medicated    Cancer Paternal Grandfather         stomach    Family History Negative Sister      History   Drug Use No             Review of Systems  Constitutional, neuro, ENT, endocrine, pulmonary, cardiac, gastrointestinal, genitourinary, musculoskeletal, integument and psychiatric systems are negative, except as otherwise noted.    Objective    /74   Pulse 59   Ht 1.797 m (5' 10.75\")   Wt 93.9 kg (207 lb)   SpO2 98%   BMI 29.08 kg/m     Estimated body mass index is 29.08 kg/m  as calculated from the following:    Height as of this encounter: 1.797 m (5' 10.75\").    Weight as of this encounter: 93.9 kg (207 lb).  Physical Exam  GENERAL: alert and no distress  EYES: Eyes grossly normal to inspection, PERRL and conjunctivae and sclerae normal  HENT: ear canals and TM's normal, nose and mouth without ulcers or lesions  NECK: no adenopathy, no asymmetry, masses, or scars  RESP: lungs clear to auscultation - no rales, rhonchi or " "wheezes  CV: regular rate and rhythm, normal S1 S2, no S3 or S4, no murmur, click or rub, no peripheral edema  ABDOMEN: soft, nontender, no hepatosplenomegaly, no masses and bowel sounds normal  MS: no gross musculoskeletal defects noted, no edema  SKIN: no suspicious lesions or rashes  NEURO: Normal strength and tone, mentation intact and speech normal  PSYCH: mentation appears normal, affect normal/bright    No results for input(s): \"HGB\", \"PLT\", \"INR\", \"NA\", \"POTASSIUM\", \"CR\", \"A1C\" in the last 8760 hours.     Diagnostics  No labs were ordered during this visit.   No EKG required for low risk surgery (cataract, skin procedure, breast biopsy, etc).    Revised Cardiac Risk Index (RCRI)  The patient has the following serious cardiovascular risks for perioperative complications:   - No serious cardiac risks = 0 points     RCRI Interpretation: 0 points: Class I (very low risk - 0.4% complication rate)         Signed Electronically by: AUDIE Gomes CNP  A copy of this evaluation report is provided to the requesting physician.       {Email feedback regarding this note to [email or web address]   :258536}  "

## 2024-10-21 ENCOUNTER — ANESTHESIA EVENT (OUTPATIENT)
Dept: SURGERY | Facility: CLINIC | Age: 44
End: 2024-10-21
Payer: COMMERCIAL

## 2024-10-21 ASSESSMENT — LIFESTYLE VARIABLES: TOBACCO_USE: 0

## 2024-10-21 NOTE — ANESTHESIA PREPROCEDURE EVALUATION
Anesthesia Pre-Procedure Evaluation    Patient: Sonny Deluca   MRN: [id number] : 1980        Procedure : Procedure(s):  Robotic splenic cyst fenestration          Past Medical History:   Diagnosis Date    NO ACTIVE PROBLEMS       Past Surgical History:   Procedure Laterality Date    IR SINOGRAM INJECTION DIAGNOSTIC  2023    IR SINOGRAM INJECTION DIAGNOSTIC  2023    IR SINOGRAM INJECTION DIAGNOSTIC  2023    IR SINOGRAM INJECTION THERAPEUTIC  3/7/2023    IR SINOGRAM INJECTION THERAPEUTIC  3/14/2023    IR SINOGRAM INJECTION THERAPEUTIC  3/21/2023    IR SKIN SUBQ/SEROMA ABSCESS DRAIN  3/1/2023    NO HISTORY OF SURGERY        No Known Allergies   Social History     Tobacco Use    Smoking status: Never    Smokeless tobacco: Never   Substance Use Topics    Alcohol use: No      Wt Readings from Last 1 Encounters:   10/07/24 93.9 kg (207 lb)        Anesthesia Evaluation            ROS/MED HX  ENT/Pulmonary:    (-) tobacco use and sleep apnea   Neurologic:       Cardiovascular:       METS/Exercise Tolerance:     Hematologic:       Musculoskeletal:       GI/Hepatic:    (-) GERD   Renal/Genitourinary:       Endo:       Psychiatric/Substance Use:       Infectious Disease:       Malignancy:       Other:            Physical Exam    Airway        Mallampati: II   TM distance: > 3 FB   Neck ROM: full   Mouth opening: > 3 cm    Respiratory Devices and Support         Dental       (+) Minor Abnormalities - some fillings, tiny chips      Cardiovascular   cardiovascular exam normal          Pulmonary   pulmonary exam normal                OUTSIDE LABS:  CBC:   Lab Results   Component Value Date    WBC 5.5 10/07/2024    WBC 5.2 2023    HGB 14.3 10/07/2024    HGB 14.2 2023    HCT 41.9 10/07/2024    HCT 36.4 (L) 2023     10/07/2024     2023     BMP:   Lab Results   Component Value Date     2023     02/15/2023    POTASSIUM 4.0 2023    POTASSIUM 4.0  "02/15/2023    CHLORIDE 104 02/16/2023    CHLORIDE 105 02/15/2023    CO2 27 02/16/2023    CO2 21 (L) 02/15/2023    BUN 9.6 02/16/2023    BUN 13.2 02/15/2023    CR 0.91 02/16/2023    CR 0.93 02/15/2023    GLC 86 02/16/2023    GLC 88 02/15/2023     COAGS:   Lab Results   Component Value Date    INR 1.01 03/01/2023    FIBR 496 (H) 02/15/2023     POC: No results found for: \"BGM\", \"HCG\", \"HCGS\"  HEPATIC:   Lab Results   Component Value Date    ALBUMIN 3.3 (L) 02/15/2023    PROTTOTAL 6.2 (L) 02/15/2023    ALT 39 02/15/2023    AST 37 02/15/2023    ALKPHOS 78 02/15/2023    BILITOTAL 1.2 02/15/2023     OTHER:   Lab Results   Component Value Date    JAILENE 8.7 02/16/2023    PHOS 2.6 02/14/2023    MAG 2.0 02/14/2023    LIPASE 16 02/14/2023       Anesthesia Plan    ASA Status:  1    NPO Status:  NPO Appropriate    Anesthesia Type: General.     - Airway: ETT   Induction: Intravenous, Propofol.   Maintenance: Inhalation.        Consents    Anesthesia Plan(s) and associated risks, benefits, and realistic alternatives discussed. Questions answered and patient/representative(s) expressed understanding.     - Discussed:     - Discussed with:  Patient            Postoperative Care    Pain management: IV analgesics, Oral pain medications.   PONV prophylaxis: Ondansetron (or other 5HT-3), Dexamethasone or Solumedrol     Comments:               Michelet Hahn MD    I have reviewed the pertinent notes and labs in the chart from the past 30 days and (re)examined the patient.  Any updates or changes from those notes are reflected in this note.                       # Overweight: Estimated body mass index is 29.08 kg/m  as calculated from the following:    Height as of 10/7/24: 1.797 m (5' 10.75\").    Weight as of 10/7/24: 93.9 kg (207 lb).             "

## 2024-10-22 ENCOUNTER — APPOINTMENT (OUTPATIENT)
Dept: SURGERY | Facility: PHYSICIAN GROUP | Age: 44
End: 2024-10-22
Payer: COMMERCIAL

## 2024-10-22 ENCOUNTER — HOSPITAL ENCOUNTER (OUTPATIENT)
Facility: CLINIC | Age: 44
Discharge: HOME | End: 2024-10-23
Attending: SURGERY | Admitting: SURGERY
Payer: COMMERCIAL

## 2024-10-22 ENCOUNTER — ANESTHESIA (OUTPATIENT)
Dept: SURGERY | Facility: CLINIC | Age: 44
End: 2024-10-22
Payer: COMMERCIAL

## 2024-10-22 DIAGNOSIS — D73.4 SPLENIC CYST: Primary | ICD-10-CM

## 2024-10-22 PROCEDURE — 88112 CYTOPATH CELL ENHANCE TECH: CPT | Mod: TC | Performed by: SURGERY

## 2024-10-22 PROCEDURE — 49329 UNLSTD LAPS PX ABD PERTM&OMN: CPT | Mod: AS | Performed by: PHYSICIAN ASSISTANT

## 2024-10-22 PROCEDURE — 250N000011 HC RX IP 250 OP 636: Performed by: SURGERY

## 2024-10-22 PROCEDURE — 250N000013 HC RX MED GY IP 250 OP 250 PS 637: Performed by: STUDENT IN AN ORGANIZED HEALTH CARE EDUCATION/TRAINING PROGRAM

## 2024-10-22 PROCEDURE — 250N000011 HC RX IP 250 OP 636: Performed by: NURSE ANESTHETIST, CERTIFIED REGISTERED

## 2024-10-22 PROCEDURE — 999N000141 HC STATISTIC PRE-PROCEDURE NURSING ASSESSMENT: Performed by: SURGERY

## 2024-10-22 PROCEDURE — 38120 LAPAROSCOPY SPLENECTOMY: CPT | Performed by: ANESTHESIOLOGY

## 2024-10-22 PROCEDURE — 88305 TISSUE EXAM BY PATHOLOGIST: CPT | Mod: 26

## 2024-10-22 PROCEDURE — 250N000009 HC RX 250: Performed by: SURGERY

## 2024-10-22 PROCEDURE — 38129 UNLISTED LAPS PX SPLEEN: CPT | Performed by: SURGERY

## 2024-10-22 PROCEDURE — 88305 TISSUE EXAM BY PATHOLOGIST: CPT | Mod: TC | Performed by: SURGERY

## 2024-10-22 PROCEDURE — 88112 CYTOPATH CELL ENHANCE TECH: CPT | Mod: 26

## 2024-10-22 PROCEDURE — 272N000001 HC OR GENERAL SUPPLY STERILE: Performed by: SURGERY

## 2024-10-22 PROCEDURE — 250N000009 HC RX 250: Performed by: NURSE ANESTHETIST, CERTIFIED REGISTERED

## 2024-10-22 PROCEDURE — 258N000003 HC RX IP 258 OP 636: Performed by: NURSE ANESTHETIST, CERTIFIED REGISTERED

## 2024-10-22 PROCEDURE — 38129 UNLISTED LAPS PX SPLEEN: CPT | Mod: AS | Performed by: PHYSICIAN ASSISTANT

## 2024-10-22 PROCEDURE — 250N000025 HC SEVOFLURANE, PER MIN: Performed by: SURGERY

## 2024-10-22 PROCEDURE — 360N000080 HC SURGERY LEVEL 7, PER MIN: Performed by: SURGERY

## 2024-10-22 PROCEDURE — 49329 UNLSTD LAPS PX ABD PERTM&OMN: CPT | Performed by: SURGERY

## 2024-10-22 PROCEDURE — 710N000009 HC RECOVERY PHASE 1, LEVEL 1, PER MIN: Performed by: SURGERY

## 2024-10-22 PROCEDURE — 38120 LAPAROSCOPY SPLENECTOMY: CPT | Performed by: NURSE ANESTHETIST, CERTIFIED REGISTERED

## 2024-10-22 PROCEDURE — 87075 CULTR BACTERIA EXCEPT BLOOD: CPT | Performed by: SURGERY

## 2024-10-22 PROCEDURE — 370N000017 HC ANESTHESIA TECHNICAL FEE, PER MIN: Performed by: SURGERY

## 2024-10-22 PROCEDURE — S2900 ROBOTIC SURGICAL SYSTEM: HCPCS | Performed by: SURGERY

## 2024-10-22 PROCEDURE — 87070 CULTURE OTHR SPECIMN AEROBIC: CPT | Performed by: SURGERY

## 2024-10-22 RX ORDER — ONDANSETRON 2 MG/ML
4 INJECTION INTRAMUSCULAR; INTRAVENOUS EVERY 30 MIN PRN
Status: DISCONTINUED | OUTPATIENT
Start: 2024-10-22 | End: 2024-10-22 | Stop reason: HOSPADM

## 2024-10-22 RX ORDER — FENTANYL CITRATE 0.05 MG/ML
50 INJECTION, SOLUTION INTRAMUSCULAR; INTRAVENOUS EVERY 5 MIN PRN
Status: DISCONTINUED | OUTPATIENT
Start: 2024-10-22 | End: 2024-10-22 | Stop reason: HOSPADM

## 2024-10-22 RX ORDER — FENTANYL CITRATE 50 UG/ML
INJECTION, SOLUTION INTRAMUSCULAR; INTRAVENOUS PRN
Status: DISCONTINUED | OUTPATIENT
Start: 2024-10-22 | End: 2024-10-22

## 2024-10-22 RX ORDER — DEXAMETHASONE SODIUM PHOSPHATE 4 MG/ML
INJECTION, SOLUTION INTRA-ARTICULAR; INTRALESIONAL; INTRAMUSCULAR; INTRAVENOUS; SOFT TISSUE PRN
Status: DISCONTINUED | OUTPATIENT
Start: 2024-10-22 | End: 2024-10-22

## 2024-10-22 RX ORDER — OXYCODONE HYDROCHLORIDE 5 MG/1
5 TABLET ORAL
Qty: 15 TABLET | Refills: 0 | Status: SHIPPED | OUTPATIENT
Start: 2024-10-22 | End: 2024-10-23

## 2024-10-22 RX ORDER — ONDANSETRON 2 MG/ML
4 INJECTION INTRAMUSCULAR; INTRAVENOUS EVERY 6 HOURS PRN
Status: DISCONTINUED | OUTPATIENT
Start: 2024-10-22 | End: 2024-10-23 | Stop reason: HOSPADM

## 2024-10-22 RX ORDER — LIDOCAINE 40 MG/G
CREAM TOPICAL
Status: DISCONTINUED | OUTPATIENT
Start: 2024-10-22 | End: 2024-10-23 | Stop reason: HOSPADM

## 2024-10-22 RX ORDER — BUPIVACAINE HYDROCHLORIDE AND EPINEPHRINE 2.5; 5 MG/ML; UG/ML
INJECTION, SOLUTION INFILTRATION; PERINEURAL PRN
Status: DISCONTINUED | OUTPATIENT
Start: 2024-10-22 | End: 2024-10-22 | Stop reason: HOSPADM

## 2024-10-22 RX ORDER — LIDOCAINE HYDROCHLORIDE 20 MG/ML
INJECTION, SOLUTION INFILTRATION; PERINEURAL PRN
Status: DISCONTINUED | OUTPATIENT
Start: 2024-10-22 | End: 2024-10-22

## 2024-10-22 RX ORDER — PROCHLORPERAZINE MALEATE 10 MG
10 TABLET ORAL EVERY 6 HOURS PRN
Status: DISCONTINUED | OUTPATIENT
Start: 2024-10-22 | End: 2024-10-23 | Stop reason: HOSPADM

## 2024-10-22 RX ORDER — ONDANSETRON 4 MG/1
4 TABLET, ORALLY DISINTEGRATING ORAL EVERY 6 HOURS PRN
Status: DISCONTINUED | OUTPATIENT
Start: 2024-10-22 | End: 2024-10-23 | Stop reason: HOSPADM

## 2024-10-22 RX ORDER — CEFAZOLIN SODIUM/WATER 2 G/20 ML
2 SYRINGE (ML) INTRAVENOUS SEE ADMIN INSTRUCTIONS
Status: DISCONTINUED | OUTPATIENT
Start: 2024-10-22 | End: 2024-10-22 | Stop reason: HOSPADM

## 2024-10-22 RX ORDER — HYDROMORPHONE HCL IN WATER/PF 6 MG/30 ML
0.4 PATIENT CONTROLLED ANALGESIA SYRINGE INTRAVENOUS EVERY 5 MIN PRN
Status: DISCONTINUED | OUTPATIENT
Start: 2024-10-22 | End: 2024-10-22 | Stop reason: HOSPADM

## 2024-10-22 RX ORDER — NALOXONE HYDROCHLORIDE 0.4 MG/ML
0.2 INJECTION, SOLUTION INTRAMUSCULAR; INTRAVENOUS; SUBCUTANEOUS
Status: DISCONTINUED | OUTPATIENT
Start: 2024-10-22 | End: 2024-10-23 | Stop reason: HOSPADM

## 2024-10-22 RX ORDER — HYDROXYZINE HYDROCHLORIDE 25 MG/1
50 TABLET, FILM COATED ORAL EVERY 6 HOURS PRN
Status: DISCONTINUED | OUTPATIENT
Start: 2024-10-22 | End: 2024-10-23 | Stop reason: HOSPADM

## 2024-10-22 RX ORDER — SODIUM CHLORIDE, SODIUM LACTATE, POTASSIUM CHLORIDE, CALCIUM CHLORIDE 600; 310; 30; 20 MG/100ML; MG/100ML; MG/100ML; MG/100ML
INJECTION, SOLUTION INTRAVENOUS CONTINUOUS
Status: DISCONTINUED | OUTPATIENT
Start: 2024-10-22 | End: 2024-10-22 | Stop reason: HOSPADM

## 2024-10-22 RX ORDER — ONDANSETRON 2 MG/ML
INJECTION INTRAMUSCULAR; INTRAVENOUS PRN
Status: DISCONTINUED | OUTPATIENT
Start: 2024-10-22 | End: 2024-10-22

## 2024-10-22 RX ORDER — HYDROMORPHONE HCL IN WATER/PF 6 MG/30 ML
0.2 PATIENT CONTROLLED ANALGESIA SYRINGE INTRAVENOUS
Status: DISCONTINUED | OUTPATIENT
Start: 2024-10-22 | End: 2024-10-23 | Stop reason: HOSPADM

## 2024-10-22 RX ORDER — VECURONIUM BROMIDE 1 MG/ML
INJECTION, POWDER, LYOPHILIZED, FOR SOLUTION INTRAVENOUS PRN
Status: DISCONTINUED | OUTPATIENT
Start: 2024-10-22 | End: 2024-10-22

## 2024-10-22 RX ORDER — HYDROMORPHONE HCL IN WATER/PF 6 MG/30 ML
0.4 PATIENT CONTROLLED ANALGESIA SYRINGE INTRAVENOUS
Status: DISCONTINUED | OUTPATIENT
Start: 2024-10-22 | End: 2024-10-23 | Stop reason: HOSPADM

## 2024-10-22 RX ORDER — ONDANSETRON 4 MG/1
4 TABLET, ORALLY DISINTEGRATING ORAL EVERY 30 MIN PRN
Status: DISCONTINUED | OUTPATIENT
Start: 2024-10-22 | End: 2024-10-22 | Stop reason: HOSPADM

## 2024-10-22 RX ORDER — OXYCODONE HYDROCHLORIDE 5 MG/1
5 TABLET ORAL EVERY 4 HOURS PRN
Status: DISCONTINUED | OUTPATIENT
Start: 2024-10-22 | End: 2024-10-23 | Stop reason: HOSPADM

## 2024-10-22 RX ORDER — CEFAZOLIN SODIUM/WATER 2 G/20 ML
2 SYRINGE (ML) INTRAVENOUS
Status: COMPLETED | OUTPATIENT
Start: 2024-10-22 | End: 2024-10-22

## 2024-10-22 RX ORDER — HYDROXYZINE HYDROCHLORIDE 25 MG/1
25 TABLET, FILM COATED ORAL EVERY 6 HOURS PRN
Status: DISCONTINUED | OUTPATIENT
Start: 2024-10-22 | End: 2024-10-23 | Stop reason: HOSPADM

## 2024-10-22 RX ORDER — NALOXONE HYDROCHLORIDE 0.4 MG/ML
0.4 INJECTION, SOLUTION INTRAMUSCULAR; INTRAVENOUS; SUBCUTANEOUS
Status: DISCONTINUED | OUTPATIENT
Start: 2024-10-22 | End: 2024-10-23 | Stop reason: HOSPADM

## 2024-10-22 RX ORDER — OXYCODONE HYDROCHLORIDE 5 MG/1
10 TABLET ORAL EVERY 4 HOURS PRN
Status: DISCONTINUED | OUTPATIENT
Start: 2024-10-22 | End: 2024-10-23 | Stop reason: HOSPADM

## 2024-10-22 RX ORDER — FENTANYL CITRATE 0.05 MG/ML
25 INJECTION, SOLUTION INTRAMUSCULAR; INTRAVENOUS EVERY 5 MIN PRN
Status: DISCONTINUED | OUTPATIENT
Start: 2024-10-22 | End: 2024-10-22 | Stop reason: HOSPADM

## 2024-10-22 RX ORDER — HYDROMORPHONE HCL IN WATER/PF 6 MG/30 ML
0.2 PATIENT CONTROLLED ANALGESIA SYRINGE INTRAVENOUS EVERY 5 MIN PRN
Status: DISCONTINUED | OUTPATIENT
Start: 2024-10-22 | End: 2024-10-22 | Stop reason: HOSPADM

## 2024-10-22 RX ORDER — AMOXICILLIN 250 MG
1-2 CAPSULE ORAL 2 TIMES DAILY PRN
Qty: 12 TABLET | Refills: 0 | Status: SHIPPED | OUTPATIENT
Start: 2024-10-22 | End: 2024-10-23

## 2024-10-22 RX ORDER — PROPOFOL 10 MG/ML
INJECTION, EMULSION INTRAVENOUS PRN
Status: DISCONTINUED | OUTPATIENT
Start: 2024-10-22 | End: 2024-10-22

## 2024-10-22 RX ORDER — SODIUM CHLORIDE, SODIUM LACTATE, POTASSIUM CHLORIDE, CALCIUM CHLORIDE 600; 310; 30; 20 MG/100ML; MG/100ML; MG/100ML; MG/100ML
INJECTION, SOLUTION INTRAVENOUS CONTINUOUS PRN
Status: DISCONTINUED | OUTPATIENT
Start: 2024-10-22 | End: 2024-10-22

## 2024-10-22 RX ORDER — ACETAMINOPHEN 325 MG/1
650 TABLET ORAL EVERY 6 HOURS PRN
Status: DISCONTINUED | OUTPATIENT
Start: 2024-10-22 | End: 2024-10-23 | Stop reason: HOSPADM

## 2024-10-22 RX ORDER — DEXAMETHASONE SODIUM PHOSPHATE 4 MG/ML
4 INJECTION, SOLUTION INTRA-ARTICULAR; INTRALESIONAL; INTRAMUSCULAR; INTRAVENOUS; SOFT TISSUE
Status: DISCONTINUED | OUTPATIENT
Start: 2024-10-22 | End: 2024-10-22 | Stop reason: HOSPADM

## 2024-10-22 RX ORDER — NALOXONE HYDROCHLORIDE 0.4 MG/ML
0.1 INJECTION, SOLUTION INTRAMUSCULAR; INTRAVENOUS; SUBCUTANEOUS
Status: DISCONTINUED | OUTPATIENT
Start: 2024-10-22 | End: 2024-10-22 | Stop reason: HOSPADM

## 2024-10-22 RX ORDER — SODIUM CHLORIDE, SODIUM LACTATE, POTASSIUM CHLORIDE, CALCIUM CHLORIDE 600; 310; 30; 20 MG/100ML; MG/100ML; MG/100ML; MG/100ML
INJECTION, SOLUTION INTRAVENOUS CONTINUOUS
Status: ACTIVE | OUTPATIENT
Start: 2024-10-22 | End: 2024-10-22

## 2024-10-22 RX ADMIN — LIDOCAINE HYDROCHLORIDE 100 MG: 20 INJECTION, SOLUTION INFILTRATION; PERINEURAL at 07:37

## 2024-10-22 RX ADMIN — DEXAMETHASONE SODIUM PHOSPHATE 8 MG: 4 INJECTION, SOLUTION INTRA-ARTICULAR; INTRALESIONAL; INTRAMUSCULAR; INTRAVENOUS; SOFT TISSUE at 07:54

## 2024-10-22 RX ADMIN — SODIUM CHLORIDE, POTASSIUM CHLORIDE, SODIUM LACTATE AND CALCIUM CHLORIDE: 600; 310; 30; 20 INJECTION, SOLUTION INTRAVENOUS at 07:30

## 2024-10-22 RX ADMIN — MIDAZOLAM 2 MG: 1 INJECTION INTRAMUSCULAR; INTRAVENOUS at 07:30

## 2024-10-22 RX ADMIN — PROPOFOL 200 MG: 10 INJECTION, EMULSION INTRAVENOUS at 07:37

## 2024-10-22 RX ADMIN — Medication 200 MG: at 09:26

## 2024-10-22 RX ADMIN — ROCURONIUM BROMIDE 50 MG: 50 INJECTION, SOLUTION INTRAVENOUS at 07:37

## 2024-10-22 RX ADMIN — HYDROMORPHONE HYDROCHLORIDE 0.5 MG: 1 INJECTION, SOLUTION INTRAMUSCULAR; INTRAVENOUS; SUBCUTANEOUS at 08:01

## 2024-10-22 RX ADMIN — PHENYLEPHRINE HYDROCHLORIDE 100 MCG: 10 INJECTION INTRAVENOUS at 08:56

## 2024-10-22 RX ADMIN — ONDANSETRON 4 MG: 2 INJECTION INTRAMUSCULAR; INTRAVENOUS at 09:09

## 2024-10-22 RX ADMIN — Medication 5 MG: at 20:27

## 2024-10-22 RX ADMIN — VECURONIUM BROMIDE 3 MG: 1 INJECTION, POWDER, LYOPHILIZED, FOR SOLUTION INTRAVENOUS at 08:33

## 2024-10-22 RX ADMIN — FENTANYL CITRATE 100 MCG: 50 INJECTION INTRAMUSCULAR; INTRAVENOUS at 07:37

## 2024-10-22 RX ADMIN — Medication 2 G: at 07:40

## 2024-10-22 ASSESSMENT — ACTIVITIES OF DAILY LIVING (ADL)
ADLS_ACUITY_SCORE: 35

## 2024-10-22 NOTE — OP NOTE
General Surgery Operative Note    Date of surgery: 10/22/2024    PREOPERATIVE DIAGNOSIS: Large splenic cyst     POSTOPERATIVE DIAGNOSIS: Same    PROCEDURE: 1) Robotic fenestration of splenic cyst    SURGEON:  Jh Centeno MD    ASSISTANT:  Mansoor Young PA-C  The PA s assistance was medically necessary to provide adequate exposure in the operating field, maintain hemostasis, cutting suture, clamping and ligating bleeding vessels, and visualization of anatomic structures throughout the surgical procedure.       ANESTHESIA:  General.    BLOOD LOSS: 30 ml    FINDINGS: large splenic cyst.  Wall was thicker than what the CT looked like.  Lots what appeared to be old blood in the cyst.  Sent for analysis.  Lots of free splenic pulp within the cyst.     INDICATIONS:   Mr. Deluca presented with A large splenic cyst which causes occasional early satiety. Patient is presenting for removal of part of the wall, biopsy and analysis of the fluid.     DETAILS OF PROCEDURE: The patient was brought to the operating room per Anesthesia, placed in supine position, and intubated without difficulty. Perioperative antibiotics were administered. The abdomen was prepped and draped in standard fashion.    A Surgical timeout was then performed, verifying the correct surgeon, site, procedure, and patient and all in the room were in agreement.     Entry into the abdomen was done through a left quadrant port site.  An incision was made in the skin and under direct vision an 8 mm robotic port using an Optiview technique was entered into the abdominal cavity.  CO2 insufflation tubing was attached and pneumoperitoneum was achieved.  A total of four 8 mm robotic ports were placed.  The patient was placed in reverse Trendelenburg and the robot was docked.    Utilizing 2 robotic graspers and a vessel sealer the omentum overlying the cyst was taken off.  It was noted that the normal spleen and cyst were somewhat identifiable.  I placed a robotic  sucker into the field and used a hook cautery to enter into the cyst.  The fluid contained within the cyst was removed.  This was close to 2 L of fluid.  It appeared chocolate brown like old blood.  I first attempted to use the vessel sealer but the tissue was too thick.  Utilizing the cautery I large removed a large piece.  Of note there were lots of free hanging splenic pulp within the cyst.  There was some bleeding when these were removed.  Due to the potential bleeding I elected to just take off the portion of the cyst that I could see and access readily.  This turned out to be a 4 x 5-1/2 cm piece.  This and some of the splenic pulp were placed in an Endo Catch bag and sent off as separate specimens.  The cyst fluid was sent off as specimens 2.  I oversewed the edge of the fenestration with a running 2-0 Vicryl in a baseball locking manner for hemostasis.  I took a tongue of omentum and sewed it in with 2 pieces of Vicryl into the cyst cavity as well.  Pictures were taken during the procedure for the patient.    As much of the spilled fluid that was seen was removed.  All ports were then removed and the robot was undocked.  All port sites were closed with 4-0 subcuticular Monocryl.  Steri-Strips and Band-Aids were applied.  The patient was woken from anesthesia returned to recovery room in good condition.  The plan given the bleeding seen is to keep him overnight for hemodynamic monitoring.  If he has no evidence of significant bleeding he will go home tomorrow.    Jh Centeno MD

## 2024-10-22 NOTE — PLAN OF CARE
Goal Outcome Evaluation:    Date & Time: 10/22/24 ([id number])  Surgery/POD#: DOS of Robotic splenic cyst fenestration   Orientation: A&O x4  ABNL VS/O2: VSS on RA  ABNL Labs: see chart  Pain Management: denies   Bowel/Bladder: voiding spontaneously   Drains: PIV SL  Wounds/incisions: port sites x4  Diet: mechanical/dental soft diet   Activity Level: up and ambulating independently   Anticipated  DC Date: home 10/23 pending Hgb   Significant Information: CMS intact. IS use encouraged.

## 2024-10-22 NOTE — ANESTHESIA PROCEDURE NOTES
Airway       Patient location during procedure: OR       Procedure Start/Stop Times: 10/22/2024 7:39 AM  Staff -        Performed By: CRNA  Consent for Airway        Urgency: elective  Indications and Patient Condition       Indications for airway management: vanessa-procedural       Induction type:intravenous       Mask difficulty assessment: 1 - vent by mask    Final Airway Details       Final airway type: endotracheal airway       Successful airway: ETT - single and Oral  Endotracheal Airway Details        ETT size (mm): 8.0       Cuffed: yes       Successful intubation technique: video laryngoscopy       VL Blade Size: Glidescope 4       Grade View of Cords: 1       Adjucts: stylet       Position: Center       Measured from: lips       Secured at (cm): 23       Bite block used: None    Post intubation assessment        Placement verified by: capnometry, equal breath sounds and chest rise        Number of attempts at approach: 1       Number of other approaches attempted: 0       Secured with: commercial tube abdi and ties       Ease of procedure: easy       Dentition: Intact and Unchanged       Dental guard used and removed.    Medication(s) Administered   Medication Administration Time: 10/22/2024 7:39 AM

## 2024-10-22 NOTE — ANESTHESIA CARE TRANSFER NOTE
Patient: Sonny Deluca    Procedure: Procedure(s):  Robotic splenic cyst fenestration       Diagnosis: Splenic cyst [D73.4]  Diagnosis Additional Information: No value filed.    Anesthesia Type:   General     Note:    Oropharynx: oropharynx clear of all foreign objects and spontaneously breathing  Level of Consciousness: awake  Oxygen Supplementation: face mask  Level of Supplemental Oxygen (L/min / FiO2): 6  Independent Airway: airway patency satisfactory and stable  Dentition: dentition unchanged  Vital Signs Stable: post-procedure vital signs reviewed and stable  Report to RN Given: handoff report given  Patient transferred to: PACU    Handoff Report: Identifed the Patient, Identified the Reponsible Provider, Reviewed the pertinent medical history, Discussed the surgical course, Reviewed Intra-OP anesthesia mangement and issues during anesthesia, Set expectations for post-procedure period and Allowed opportunity for questions and acknowledgement of understanding      Vitals:  Vitals Value Taken Time   /74 10/22/24 0936   Temp     Pulse 76 10/22/24 0939   Resp 10 10/22/24 0939   SpO2 99 % 10/22/24 0939   Vitals shown include unfiled device data.    Electronically Signed By: AUDIE Cordon CRNA  October 22, 2024  9:41 AM

## 2024-10-22 NOTE — ANESTHESIA POSTPROCEDURE EVALUATION
Patient: Sonny Deluca    Procedure: Procedure(s):  Robotic splenic cyst fenestration       Anesthesia Type:  General    Note:  Disposition: Outpatient   Postop Pain Control: Uneventful            Sign Out: Well controlled pain   PONV: No   Neuro/Psych: Uneventful            Sign Out: Acceptable/Baseline neuro status   Airway/Respiratory: Uneventful            Sign Out: Acceptable/Baseline resp. status   CV/Hemodynamics: Uneventful            Sign Out: Acceptable CV status   Other NRE: NONE   DID A NON-ROUTINE EVENT OCCUR? No           Last vitals:  Vitals Value Taken Time   /65 10/22/24 1100   Temp 36.8  C (98.3  F) 10/22/24 1100   Pulse 64 10/22/24 1100   Resp 15 10/22/24 1100   SpO2 98 % 10/22/24 1100       Electronically Signed By: Michelet Hahn MD  October 22, 2024  2:30 PM

## 2024-10-22 NOTE — PROVIDER NOTIFICATION
MD Notification    Notified Person: MD    Notified Person Name: Dr. Roly Santos    Notification Date/Time: 10/22 1748    Notification Interaction: answering service     Purpose of Notification: pt requesting sleep aid     Orders Received: 5mg melatonin at bedtime PRN and 25mg or 50mg hydroxyzine q6h PRN    Comments:

## 2024-10-22 NOTE — BRIEF OP NOTE
[facility]    Brief Operative Note    Pre-operative diagnosis: Splenic cyst [D73.4]  Post-operative diagnosis Splenic Cyst    Procedure: Robotic splenic cyst fenestration, N/A - Abdomen    Surgeon: Surgeons and Role:     * Jh Centeno MD - Primary     * Mansoor Young PA-C - Assisting    Anesthesia: General   Estimated Blood Loss: 30 mL from 10/22/2024  7:30 AM to 10/22/2024  9:33 AM  Additionally, cyst contents suctioned out for a total of 1700 mL.  Appeared grossly like old blood, but will be sent for cytology to confirm.    Drains: None  Specimens:   ID Type Source Tests Collected by Time Destination   1 : Splenic cyst Body fluid, unsp Spleen ANAEROBIC BACTERIAL CULTURE ROUTINE, AEROBIC BACTERIAL CULTURE ROUTINE, NON-GYNECOLOGIC CYTOLOGY Jh Centeno MD 10/22/2024  8:34 AM    2 : Spleenic cyst wall Tissue Spleen SURGICAL PATHOLOGY EXAM Jh Centeno MD 10/22/2024  8:43 AM    3 : Cyst contents Tissue Spleen SURGICAL PATHOLOGY EXAM Jh Centeno MD 10/22/2024  8:45 AM      Findings:   See Operative Report .  Large splenic cyst with what looked like old blood for contents.  Unroofed section that was 4cm wide x 5.5cm long.  Stitched omentum to spleen.    Complications: None.  Implants: * No implants in log *    Yao Young PA-C  [phone]      
No

## 2024-10-23 VITALS
HEIGHT: 70 IN | RESPIRATION RATE: 18 BRPM | OXYGEN SATURATION: 97 % | BODY MASS INDEX: 29.56 KG/M2 | HEART RATE: 63 BPM | TEMPERATURE: 98.2 F | DIASTOLIC BLOOD PRESSURE: 69 MMHG | WEIGHT: 206.5 LBS | SYSTOLIC BLOOD PRESSURE: 114 MMHG

## 2024-10-23 LAB
ERYTHROCYTE [DISTWIDTH] IN BLOOD BY AUTOMATED COUNT: 13 % (ref 10–15)
HCT VFR BLD AUTO: 41.7 % (ref 40–53)
HGB BLD-MCNC: 13.9 G/DL (ref 13.3–17.7)
MCH RBC QN AUTO: 26.9 PG (ref 26.5–33)
MCHC RBC AUTO-ENTMCNC: 33.3 G/DL (ref 31.5–36.5)
MCV RBC AUTO: 81 FL (ref 78–100)
PATH REPORT.COMMENTS IMP SPEC: NORMAL
PATH REPORT.COMMENTS IMP SPEC: NORMAL
PATH REPORT.FINAL DX SPEC: NORMAL
PATH REPORT.GROSS SPEC: NORMAL
PATH REPORT.MICROSCOPIC SPEC OTHER STN: NORMAL
PATH REPORT.RELEVANT HX SPEC: NORMAL
PHOTO IMAGE: NORMAL
PLATELET # BLD AUTO: 187 10E3/UL (ref 150–450)
RBC # BLD AUTO: 5.16 10E6/UL (ref 4.4–5.9)
WBC # BLD AUTO: 8.9 10E3/UL (ref 4–11)

## 2024-10-23 PROCEDURE — 250N000013 HC RX MED GY IP 250 OP 250 PS 637: Performed by: PHYSICIAN ASSISTANT

## 2024-10-23 PROCEDURE — 88305 TISSUE EXAM BY PATHOLOGIST: CPT | Mod: 26 | Performed by: PATHOLOGY

## 2024-10-23 PROCEDURE — 36415 COLL VENOUS BLD VENIPUNCTURE: CPT | Performed by: PHYSICIAN ASSISTANT

## 2024-10-23 PROCEDURE — 85027 COMPLETE CBC AUTOMATED: CPT | Performed by: PHYSICIAN ASSISTANT

## 2024-10-23 RX ADMIN — ACETAMINOPHEN 650 MG: 325 TABLET, FILM COATED ORAL at 07:57

## 2024-10-23 ASSESSMENT — ACTIVITIES OF DAILY LIVING (ADL)
ADLS_ACUITY_SCORE: 0
ADLS_ACUITY_SCORE: 35
ADLS_ACUITY_SCORE: 0

## 2024-10-23 NOTE — DISCHARGE INSTRUCTIONS
[facility]  Discharge Instructions: Post-Operative Abdominal Surgery    ACTIVITY  Take frequent, short walks and increase your activity gradually.    Avoid strenuous physical activity or heavy lifting greater than 15 lbs. for 3-4 weeks.  You may climb stairs.  You may drive without restrictions when you are not using any prescription pain medication or muscle relaxant and feel comfortable in a car.  You may return to work/school when you are comfortable without any prescription pain medication.     WOUND CARE  You may remove your outer dressing or Band-Aids and shower 48 hours after the surgery.  Pat your incisions dry and leave them open to air.  Re-apply dressing (Band-Aids or gauze/tape) as needed for comfort or drainage.  You may have steri-strips (looks like white tape) on your incision.  You may peel off the steri-strips 2 weeks after your surgery if they have not peeled off on their own.  If you have skin glue, it will peel up and fall off on its own.  Do not soak your incisions in a tub or pool for 2 weeks.   Do not apply any lotions, creams, or ointments to your incisions.  A ridge under your incisions is normal and will gradually resolve.    DIET  Start with liquids, then gradually resume your regular diet as tolerated.   Drink plenty of fluids to stay hydrated.    PAIN  Expect some tenderness and discomfort at the incision site(s).  Use the prescribed pain medication/muscle relaxant at your discretion.  Expect gradual resolution of your pain over several days.  You may take ibuprofen with food (unless you have been told not to) or acetaminophen/Tylenol instead of or in addition to your prescribed pain medication.    Do not drink alcohol or drive while you are taking pain medications or muscle relaxants.  You may apply ice to your incisions in 20 minute intervals as needed for the next 48 hours.  After that time, consider switching to heat if you prefer.    EXPECTATIONS  Pain  medications can cause constipation.  Limit use when possible.  Take an over the counter or prescribed stool softener/stimulant, such as Colace or Senna, 1-2 times a day with plenty of water.  You may take a mild over the counter laxative, such as Miralax or a suppository, as needed.  You may take 1 oz. (2 tablespoons) Milk of Magnesia the evening following surgery to encourage bowel movement.  You make discontinue these medications once you are having regular bowel movements and/or are no longer taking your narcotic pain medication.  You may have shoulder or upper back discomfort due to the gas used in surgery.  This is temporary and should resolve in 48-72 hours.  Short, frequent walks may help with this.  If you are unable to urinate for 8 hours or feel as though you are not emptying your bladder adequately, we recommend you seek care at an ER or Urgent Care facility for possible catheter placement.     FOLLOW UP  Please call us at [phone] to scheduled a 2-3 week post operative visit with your surgeon.    We are located at [address] [place] [address].    CALL OUR OFFICE [phone] IF YOU HAVE:   Chills or fever above 101 F.  Increased redness or drainage at your incisions.  Significant bleeding.  Pain not relieved by your pain medication or rest.  Increasing pain after the first 48 hours.  Any other concerns or questions.             Revised October 2022

## 2024-10-23 NOTE — PLAN OF CARE
Goal Outcome Evaluation:    Pt discharged home with family. All discharge instructions reviewed with pt. All questions answered. All pt belonging sent with pt. VSS RA, afebrile. Hgb 13.9 stable. Dressing WDL. Pain welled controlled with tylenol.

## 2024-10-23 NOTE — DISCHARGE SUMMARY
[facility] Discharge Summary    Sonny Deluca MRN# [id number]   Age: 44 year old YOB: 1980     Date of Admission:  10/22/2024  Date of Discharge:  10/23/2024  Admitting Provider:  Jh Centeno MD  Discharge Provider:  Mansoor Young PA-C  Discharging Service: General Surgery     Primary Provider: Shazia Conley  Primary Care Physician Phone Number: [phone]          Admission Diagnoses:   Principle Diagnosis: Splenic cyst [D73.4]  Secondary Diagnoses:          Discharge Diagnosis:     Splenic cyst [D73.4]          Procedures:     Procedure(s): Robotic fenestration of splenic cyst             Discharge Medications:   There are no discharge medications for this patient.  Sonny felt his pain would be controlled with Tyl and IB alone.  If he has more pain post op, he will call for a small Rx of pain medications.        Allergies:       No Known Allergies          Brief History of Illness:    Reason for your hospital stay      Spleen cyst surgery        Mr. Deluca presented with A large splenic cyst which causes occasional early satiety. Patient is presenting for removal of part of the wall, biopsy and analysis of the fluid.     After discussing the risks, benefits, and possible complications, informed consent was obtained and the patient underwent the above procedure.  There were no complications.  Please see the Operative Report for full details.           Hospital Course:   Sonny Deluca's hospital course was unremarkable.  He recovered as anticipated and experienced no post-operative complications. His hgb was stable POD1, tolerating diet, vitally stable and felt pain well controlled.    On the date of discharge, the patient was discharged to home in stable condition and afebrile.  He verbalized understanding of all discharge instructions and felt comfortable with the discharge plan.  He was asked to call with any further questions or concerns.         Condition on Discharge:     "    Discharge condition: Stable   Discharge vitals: Blood pressure 114/69, pulse 63, temperature 98.2  F (36.8  C), temperature source Oral, resp. rate 18, height 1.778 m (5' 10\"), weight 93.7 kg (206 lb 8 oz), SpO2 97%.           Discharge Disposition:     Discharged to home          Discharge Instructions and Follow-Up:      Sonny Deluca was asked to follow up with surgical team in 2-3 weeks.      Mansoor Young PA-C  Dictating on behalf of Dr. Jh Centeno  General Surgery  [facility]  [phone]      "

## 2024-10-23 NOTE — PROGRESS NOTES
Date & Time: 10/22/24 ([id number])  Surgery/POD#: DOS of Robotic splenic cyst fenestration   Orientation: A&O x4  ABNL VS/O2: VSS on RA  ABNL Labs: see chart  Pain Management: denies   Bowel/Bladder: voiding spontaneously   Drains: PIV SL  Wounds/incisions: port sites x4 CDI  Diet: mechanical/dental soft diet   Activity Level: Indep  Anticipated  DC Date: home 10/23

## 2024-10-24 LAB
PATH REPORT.COMMENTS IMP SPEC: NORMAL
PATH REPORT.COMMENTS IMP SPEC: NORMAL
PATH REPORT.FINAL DX SPEC: NORMAL
PATH REPORT.GROSS SPEC: NORMAL
PATH REPORT.MICROSCOPIC SPEC OTHER STN: NORMAL
PATH REPORT.RELEVANT HX SPEC: NORMAL

## 2024-10-27 LAB — BACTERIA FLD CULT: NO GROWTH

## 2024-10-29 LAB — BACTERIA FLD CULT: NORMAL

## 2024-11-14 ENCOUNTER — OFFICE VISIT (OUTPATIENT)
Dept: SURGERY | Facility: CLINIC | Age: 44
End: 2024-11-14
Payer: COMMERCIAL

## 2024-11-14 DIAGNOSIS — D73.4 SPLENIC CYST: Primary | ICD-10-CM

## 2024-11-14 NOTE — LETTER
November 15, 2024          AUDIE Gomes CNP  [address] [place] [address]      RE:   Sonny Deluca 1980      Dear Colleague,    Thank you for referring your patient, Sonny Deluca, to [facility]. Please see a copy of my visit note below.    Sonny Deluca is a 44 year old male who I did a robotic splenic fenestration on.  This was done on 10/22/2024.  He has done well since the operation.  He stated he had very little abdominal pain after surgery.  He has had no early satiety or other problems.  He states he has been sleeping better as he is less worried about the cyst now.     It was sent for laboratory examination.  This looks to be an old hematoma.  He again denies trauma to his spleen.     He would like a follow up CT in a year.  He will contact our office to schedule this.     All wounds are well healed.       I did review his CT scans.  He does have a small umbilical hernia which he was unaware of.    Again, thank you for allowing me to participate in the care of your patient.      Sincerely,      Jh Centeno MD     stable

## 2024-11-15 NOTE — PROGRESS NOTES
Surgery Consult[facility], [facility], GIANNI Centeno MD    Sonny Deluca MRN# [id number]   YOB: 1980 Age: 44 year old       Sonny Deluca is a 44 year old male who I did a robotic splenic fenestration on.  This was done on 10/22/2024.  He has done well since the operation.  He stated he had very little abdominal pain after surgery.  He has had no early satiety or other problems.  He states he has been sleeping better as he is less worried about the cyst now.    It was sent for laboratory examination.  This looks to be an old hematoma.  He again denies trauma to his spleen.    He would like a follow up CT in a year.  He will contact our office to schedule this.    All wounds are well healed.      I did review his CT scans.  He does have a small umbilical hernia which he was unaware of.    Jh Centeno M.D., F.A.C.SMet[facility]  [facility]  [facility] [address]  [phone]

## 2025-04-09 ENCOUNTER — ANCILLARY PROCEDURE (OUTPATIENT)
Dept: GENERAL RADIOLOGY | Facility: CLINIC | Age: 45
End: 2025-04-09
Attending: NURSE PRACTITIONER
Payer: COMMERCIAL

## 2025-04-09 ENCOUNTER — OFFICE VISIT (OUTPATIENT)
Dept: URGENT CARE | Facility: URGENT CARE | Age: 45
End: 2025-04-09
Payer: COMMERCIAL

## 2025-04-09 VITALS
OXYGEN SATURATION: 96 % | TEMPERATURE: 98.1 F | BODY MASS INDEX: 29.2 KG/M2 | HEIGHT: 70 IN | DIASTOLIC BLOOD PRESSURE: 75 MMHG | HEART RATE: 91 BPM | SYSTOLIC BLOOD PRESSURE: 109 MMHG | WEIGHT: 204 LBS | RESPIRATION RATE: 18 BRPM

## 2025-04-09 DIAGNOSIS — S82.64XA NONDISPLACED FRACTURE OF LATERAL MALLEOLUS OF RIGHT FIBULA, INITIAL ENCOUNTER FOR CLOSED FRACTURE: Primary | ICD-10-CM

## 2025-04-09 DIAGNOSIS — L03.115 CELLULITIS OF RIGHT LEG: ICD-10-CM

## 2025-04-09 DIAGNOSIS — M25.571 ACUTE RIGHT ANKLE PAIN: ICD-10-CM

## 2025-04-09 PROCEDURE — 3078F DIAST BP <80 MM HG: CPT | Performed by: NURSE PRACTITIONER

## 2025-04-09 PROCEDURE — 96372 THER/PROPH/DIAG INJ SC/IM: CPT | Performed by: NURSE PRACTITIONER

## 2025-04-09 PROCEDURE — 3074F SYST BP LT 130 MM HG: CPT | Performed by: NURSE PRACTITIONER

## 2025-04-09 PROCEDURE — 99214 OFFICE O/P EST MOD 30 MIN: CPT | Mod: 25 | Performed by: NURSE PRACTITIONER

## 2025-04-09 PROCEDURE — 1125F AMNT PAIN NOTED PAIN PRSNT: CPT | Performed by: NURSE PRACTITIONER

## 2025-04-09 PROCEDURE — 73610 X-RAY EXAM OF ANKLE: CPT | Mod: TC | Performed by: RADIOLOGY

## 2025-04-09 RX ORDER — CEFTRIAXONE SODIUM 1 G
1 VIAL (EA) INJECTION ONCE
Status: COMPLETED | OUTPATIENT
Start: 2025-04-09 | End: 2025-04-09

## 2025-04-09 RX ORDER — CEPHALEXIN 500 MG/1
500 CAPSULE ORAL 3 TIMES DAILY
Qty: 21 CAPSULE | Refills: 0 | Status: SHIPPED | OUTPATIENT
Start: 2025-04-09

## 2025-04-09 RX ADMIN — Medication 1 G: at 11:07

## 2025-04-09 ASSESSMENT — PAIN SCALES - GENERAL: PAINLEVEL_OUTOF10: SEVERE PAIN (8)

## 2025-04-09 NOTE — PATIENT INSTRUCTIONS
May take ibuprofen 2 to 3 tablets every 6 hours as needed for pain.    Try to apply ice over the area for 20 to 30 minutes for the day for the next several days.    Wear boot at all times.  You may not remove this for showering until cleared by orthopedics.    Keep ankle elevated whenever possible.    Open up boot daily and observe the redness to be sure it is receding.  If it appears to be worsening go to the emergency room.    Take all antibiotics as prescribed starting tomorrow.       Island Pedicle Flap-Requiring Vessel Identification Text: The defect edges were debeveled with a #15 scalpel blade.  Given the location of the defect, shape of the defect and the proximity to free margins an island pedicle advancement flap was deemed most appropriate.  Using a sterile surgical marker, an appropriate advancement flap was drawn, based on the axial vessel mentioned above, incorporating the defect, outlining the appropriate donor tissue and placing the expected incisions within the relaxed skin tension lines where possible.    The area thus outlined was incised deep to adipose tissue with a #15 scalpel blade.  The skin margins were undermined to an appropriate distance in all directions around the primary defect and laterally outward around the island pedicle utilizing iris scissors.  There was minimal undermining beneath the pedicle flap.

## 2025-04-09 NOTE — PROGRESS NOTES
Urgent Care Clinic Visit    Chief Complaint   Patient presents with    Urgent Care    Fall     Pt reports he fell down the stairs and twisted right ankle, swelling and pain at 7/10 onset yesterday afternoon   Placed iced pack and took ibuprofen yesterday                4/9/2025     9:59 AM   Additional Questions   Roomed by Genesis   Accompanied by Self       Clinic Administered Medication Documentation        Patient was given Rocephin 1 gram. Prior to medication administration, verified patient's identity using patient s name and date of birth. Please see MAR and medication order for additional information. Patient instructed to remain in clinic for 15 minutes and report any adverse reaction to staff immediately.    Vial/Syringe: Single dose vial. Was entire vial of medication used? Yes      Umm Sparks, CMA

## 2025-04-09 NOTE — PROGRESS NOTES
"Chief Complaint   Patient presents with    Urgent Care    Fall     Pt reports he fell down the stairs and twisted right ankle, swelling and pain at 7/10 onset yesterday afternoon   Placed iced pack and took ibuprofen yesterday          ICD-10-CM    1. Nondisplaced fracture of lateral malleolus of right fibula, initial encounter for closed fracture  S82.64XA Orthopedic  Referral      2. Acute right ankle pain  M25.571 XR Ankle Right G/E 3 Views     Ankle/Foot Bracing Supplies Order Walking Boot; Right; Non-pneumatic; Tall     Crutches Order for DME - ONLY FOR DME      3. Cellulitis of right leg  L03.115 cefTRIAXone (ROCEPHIN) in lidocaine 1% for IM administration 1 g     cephALEXin (KEFLEX) 500 MG capsule      Ice, ibuprofen, elevation, no weightbearing, use crutches at all times, no removing the boot until seen by orthopedics.  Referral made for him to see orthopedics in about a week.    Red flag warning signs and when to go to the emergency room discussed.  Reviewed potential adverse reactions to medications.    Xray - Reviewed and interpreted by me.  Right ankle x-ray shows nondisplaced fracture of the right lateral malleolus otherwise normal.    Subjective     Sonny Deluca is an 44 year old male who presents to clinic today for pain in the right ankle after falling down the stairs yesterday.  He has not been able to bear weight without a great deal of discomfort.  He denies any previous injury to this area.  He also has a small abrasion over the shin.  He has not been febrile.     Objective    /75 (BP Location: Right arm, Patient Position: Sitting, Cuff Size: Adult Large)   Pulse 91   Temp 98.1  F (36.7  C) (Tympanic)   Resp 18   Ht 1.778 m (5' 10\")   Wt 92.5 kg (204 lb)   SpO2 96%   BMI 29.27 kg/m    Nurses notes and VS have been reviewed.  Last tetanus was in 2024.    Physical Exam       GENERAL APPEARANCE: healthy appearing, alert     MS: extremities normal- no gross deformities " noted; normal muscle tone, except for the right ankle and foot which shows a large amount of edema particularly over the lateral side down into the fourth and fifth metatarsal.  Tender over the right lateral malleolus     SKIN: 1 cm x 3 cm abrasion to the right shin with surrounding erythema that extends up the leg and down to the top of the foot, hot to the touch     NEURO: Normal strength and tone (not tested on the right foot and ankle), mentation intact and speech normal, normal sensation to the right foot and ankle      AUDIE Lima, CNP  [facility] Provider    The use of Dragon/spotflux dictation services may have been used to construct the content in this note; any grammatical or spelling errors are non-intentional. Please contact the author of this note directly if you are in need of any clarification.

## 2025-04-11 ENCOUNTER — OFFICE VISIT (OUTPATIENT)
Dept: ORTHOPEDICS | Facility: CLINIC | Age: 45
End: 2025-04-11
Payer: COMMERCIAL

## 2025-04-11 DIAGNOSIS — S82.64XA NONDISPLACED FRACTURE OF LATERAL MALLEOLUS OF RIGHT FIBULA, INITIAL ENCOUNTER FOR CLOSED FRACTURE: ICD-10-CM

## 2025-04-11 PROCEDURE — 99203 OFFICE O/P NEW LOW 30 MIN: CPT | Performed by: STUDENT IN AN ORGANIZED HEALTH CARE EDUCATION/TRAINING PROGRAM

## 2025-04-11 NOTE — PROGRESS NOTES
SPORTS MEDICINE CLINIC NEW PATIENT VISIT    REFERRAL SOURCE: Maeve Gibbs CNP    PATIENT'S GOAL FOR APPOINTMENT: Check in, plan for boot/crutches, timeline    HISTORY OF PRESENT ILLNESS  Sonny Deluca is a 44 year old male presenting as a new patient with right ankle/foot pain    When did problem start?/Trauma associated with onset?:  Tuesday 4/8/2025. Fell down stairs and rolled ankle.     Location & description of pain:  Lateral ankle, lateral malleolus     Exacerbating factors:   Small movements, ankle ROM    Remitting factors:  Boot, Crutches    Previous Treatments:  -Medications: Ibuprofen  -Rehabilitation: None  -Durable Medical Equipment: Boot, Crutches  -Injections: None  -Modalities: None  -Other Providers seen: Bernie    Sports, Hobbies, Employment:  Cycling, [employer], Walking,  - Desk worker    Average hours of sleep per night: 7     Average minutes of exercise per day: Daily 30-60    Area of Problem  4/11/2025  Date 2 Date 3 Date 4 Date 5   Function Ability in last week - % of Baseline (0 is worst & 100 is best) 100       Sport/Activity Ability in last week - % of Baseline (0 is worst & 100 is best) 0       Pain Level in the last week (0 is best & 10 is worst) 7         Additional Information of consideration:  -Poor Balance? None  -Numbness/paresthesias in extremities? None    MEDICATIONS    Current Outpatient Medications:     cephALEXin (KEFLEX) 500 MG capsule, Take 1 capsule (500 mg) by mouth 3 times daily., Disp: 21 capsule, Rfl: 0    ALLERGIES  No Known Allergies    PAST MEDICAL HISTORY  Past Medical History:   Diagnosis Date    NO ACTIVE PROBLEMS        PAST SURGICAL HISTORY  Past Surgical History:   Procedure Laterality Date    IR SINOGRAM INJECTION DIAGNOSTIC  4/4/2023    IR SINOGRAM INJECTION DIAGNOSTIC  4/11/2023    IR SINOGRAM INJECTION DIAGNOSTIC  4/21/2023    IR SINOGRAM INJECTION THERAPEUTIC  3/7/2023    IR SINOGRAM INJECTION THERAPEUTIC  3/14/2023    IR SINOGRAM INJECTION  "THERAPEUTIC  3/21/2023    IR SKIN SUBQ/SEROMA ABSCESS DRAIN  3/1/2023    NO HISTORY OF SURGERY      SPLENECTOMY, ROBOT-ASSISTED, LAPAROSCOPIC, USING DA ZANA XI N/A 10/22/2024    Procedure: Robotic splenic cyst fenestration;  Surgeon: Jh Centeno MD;  Location:  OR       SOCIAL HISTORY  Social History     Tobacco Use    Smoking status: Never    Smokeless tobacco: Never   Vaping Use    Vaping status: Never Used   Substance Use Topics    Alcohol use: No    Drug use: No        FAMILY HISTORY  Family History   Problem Relation Age of Onset    Alcohol/Drug Father     Depression Mother         medicated    Cancer Paternal Grandfather         stomach    Family History Negative Sister        REVIEW OF SYSTEMS  Complete 12 system Review of Systems performed and was negative except for HPI.    VITALS  Estimated body mass index is 29.27 kg/m  as calculated from the following:    Height as of 4/9/25: 1.778 m (5' 10\").    Weight as of 4/9/25: 92.5 kg (204 lb).    PHYSICAL EXAMINATION   General: Age appropriate appearing, no acute distress  HEENT: normocephalic, atraumatic, sclera non-icteric  Skin: No open skin lesion noted in visible areas.  Respiratory: Non labored breathing, No wheezes.  Cardiac/Vessels: No edema, cyanosis, clubbing noted in all extremities.  Lymph: No palpable lymph node swelling noted around the affected area.  Mental: There was no signs of aberrant behaviors noted. Patient was pleasant throughout the encounter.    Functional Movements: Right walking boot in place    ANKLE/FOOT:   Inspection: Right lateral ankle swelling  Palpation: TTP right lateral malleolus    IMAGING STUDIES:  4/9/2025 Right ankle x-ray: \"Acute oblique nondisplaced fracture of the lateral malleolus with moderate overlying soft tissue swelling. No additional fractures. The ankle mortise is intact. \"    I personally reviewed these images and agree with the radiology report and shared the findings with the " patient.    IMPRESSION  Sonny Deluca is a very pleasant 44 year old male for evaluation of right lateral ankle pain after falling down stairs rolling his ankle on 4/8/2025 sustaining a nondisplaced lateral malleolus fracture supported by clinical and radiographic assessment.     PLAN  The following was discussed with the patient:  Activity Modification: Immobilization in a walking fracture boot needed typically for 4-8 weeks.  Ice and Elevation for 15 minutes nightly while pain/swelling persists.  Imaging/Tests: 4/9/2025 Ankle radiographs reviewed as noted above  Rehabilitation: None at this time.  Ankle ROM exercises, calf stretching and strengthening after immobilization to regain full dorsiflexion and peroneal muscle strength.  Orthotics/Bracing: Continue walking fracture boot  Medication: Tylenol or Ibuprofen as needed  Follow-up Plan: 4 weeks  Resources Provided: Written and verbal information detailing above findings and plan provided including after visit summary.    They were encouraged to message me on [employer] whenever they needed.    The patient was in agreement with this plan. All questions were answered to the best of my ability.    Total time (face-to-face and non-face-to-face) spent on today's visit was 30 minutes. This included preparation for the visit (i.e. reviewing test results), performance of a medically appropriate history and examination, placing orders for medications, tests or other procedures, and discussing the plan of care with the patient. This time is exclusive of procedures performed and time spent teaching.    Josué Forrester MD, [facility]  Sports Medicine Attending Physician  Department of Physical Medicine & Rehabilitation

## 2025-04-11 NOTE — Clinical Note
4/11/2025      Sonny Deluca  [address] [facility] [address]      Dear Colleague,    Thank you for referring your patient, Sonny Deluca, to the [facility] [place]. Please see a copy of my visit note below.    SPORTS MEDICINE CLINIC NEW PATIENT VISIT    REFERRAL SOURCE: Maeve Gibbs    PATIENT'S GOAL FOR APPOINTMENT: Check in, plan for boot/crutches, timeline    HISTORY OF PRESENT ILLNESS  Sonny Deluca is a 44 year old male presenting as a new patient with right ankle/foot pain    When did problem start?/Trauma associated with onset?:  Tuesday. Fell down stairs and rolled ankle.     Location & description of pain:  Lateral ankle, lateral malleolus     Exacerbating factors:   Small movements, ankle ROM    Remitting factors:  Boot, Crutches    Previous Treatments:  -Medications: Ibuprofen  -Rehabilitation: None  -Durable Medical Equipment: Boot, Crutches  -Injections: None  -Modalities: None  -Other Providers seen: Bernie    Sports, Hobbies, Employment:  Cycling, [employer], Walking,     Average hours of sleep per night: 7     Average minutes of exercise per day: Daily 30-60    Area of Problem  Date 1  Date 2 Date 3 Date 4 Date 5   Function Ability in last week - % of Baseline (0 is worst & 100 is best) 100       Sport/Activity Ability in last week - % of Baseline (0 is worst & 100 is best) 0       Pain Level in the last week (0 is best & 10 is worst) 7         Additional Information of consideration:  -Poor Balance? None  -Numbness/paresthesias in extremities? None    MEDICATIONS    Current Outpatient Medications:     cephALEXin (KEFLEX) 500 MG capsule, Take 1 capsule (500 mg) by mouth 3 times daily., Disp: 21 capsule, Rfl: 0    ALLERGIES  No Known Allergies    PAST MEDICAL HISTORY  Past Medical History:   Diagnosis Date    NO ACTIVE PROBLEMS        PAST SURGICAL HISTORY  Past Surgical History:   Procedure Laterality Date    IR SINOGRAM INJECTION DIAGNOSTIC   4/4/2023    IR SINOGRAM INJECTION DIAGNOSTIC  4/11/2023    IR SINOGRAM INJECTION DIAGNOSTIC  4/21/2023    IR SINOGRAM INJECTION THERAPEUTIC  3/7/2023    IR SINOGRAM INJECTION THERAPEUTIC  3/14/2023    IR SINOGRAM INJECTION THERAPEUTIC  3/21/2023    IR SKIN SUBQ/SEROMA ABSCESS DRAIN  3/1/2023    NO HISTORY OF SURGERY      SPLENECTOMY, ROBOT-ASSISTED, LAPAROSCOPIC, USING DA ZANA XI N/A 10/22/2024    Procedure: Robotic splenic cyst fenestration;  Surgeon: Jh Centeno MD;  Location:  OR       SOCIAL HISTORY  Social History     Tobacco Use    Smoking status: Never    Smokeless tobacco: Never   Vaping Use    Vaping status: Never Used   Substance Use Topics    Alcohol use: No    Drug use: No        FAMILY HISTORY  Family History   Problem Relation Age of Onset    Alcohol/Drug Father     Depression Mother         medicated    Cancer Paternal Grandfather         stomach    Family History Negative Sister        REVIEW OF SYSTEMS  Complete 12 system Review of Systems performed and was negative except for HPI.    VITALS  There were no vitals filed for this visit.    PHYSICAL EXAMINATION ***  General: Age appropriate appearing, no acute distress  HEENT: normocephalic, atraumatic, sclera non-icteric  Skin: No open skin lesion noted in visible areas.  Respiratory: Non labored breathing, No wheezes.  Cardiac/Vessels: No edema, cyanosis, clubbing noted in all extremities.  Lymph: No palpable lymph node swelling noted around the affected area.  Mental: There was no signs of aberrant behaviors noted. Patient was pleasant throughout the encounter.    Functional Movements: Non-antalgic gait appreciated.    Without difficulty, patient is able to ***walk on heels, walk on toes, squat, balance on each leg    ANKLE/FOOT: Shoe Wear: Tennis Shoes. No orthotics.  Inspection: Bilaterally, No deformities, atrophy, effusion, warmth, and Normal Arch noted. No pronation or supination of ankles.  Palpation: ***  Range of Motion [estimated  degrees, active (patient performed) unless noted, L/R]:    Inversion [30/30]  Eversion [20/20]  Dorsiflexion [20/20]  Plantarflexion(in knee flexion = soleus vs. knee extension = gastroc) [40/40]  Reflexes  [L (0-4) / R (0-4)]: Patella [2/2], Achilles [2/2]  Sensation: Intact to light touch bilaterally at the anterior thigh, medial knee, medial malleoli, dorsum of foot, and lateral malleoli.  Proprioception intact in both big toes***  Strength [L (0-5) / R (0-5)]:   Hip: Flexion [5/5], Abduction [5/5], Adduction [5/5]  Knee: Extension [5/5], Flexion [5/5]  Ankle: Eversion [5/5], Inversion [5/5], Dorsiflexion [5/5], Plantarflexion [5/5]  Great Toe: Extension [5/5]  Special Testing:   Squeeze test (High ankle sprain or Cummings's Neuroma): (-)  Anterior drawer test (ATFL): (-)  Talar tilt test (CFL): (-)  Resisted eversion: (-)  Hill test (achilles): (-)  Straight leg raise: (-)    IMAGING STUDIES:  Put Date here *** XR/MRI,etc    I personally reviewed these images and agree with the radiology report and shared the findings with the patient.    IMPRESSION  Sonny Deluca is a very pleasant 44 year old male who is presenting today with *** ankle/foot pain that seems related to ***    PLAN  The following was discussed with the patient:  Activity Modification: ***  Imaging/Tests: ***  Rehabilitation: ***  Orthotics/Bracing: ***  Medication: ***  Interventions: ***  Referral: ***  Follow-up Plan: ***  Resources Provided: ***    They were encouraged to message me on [employer] whenever they needed.    The patient was in agreement with this plan. All questions were answered to the best of my ability.    Josué Forrester MD, [facility]  Sports Medicine Attending Physician  Department of Physical Medicine & Rehabilitation      Again, thank you for allowing me to participate in the care of your patient.        Sincerely,        Josué Forrester MD    Electronically signed

## 2025-04-11 NOTE — PATIENT INSTRUCTIONS
Sonny Deluca, It was nice to see you in our office today.      DIAGNOSIS:    1. Nondisplaced fracture of lateral malleolus of right fibula, initial encounter for closed fracture      FOR FOLLOW-UP CARE:  Activity Modification: Immobilization in a walking fracture boot needed typically for 4-8 weeks.  Ice and Elevation for 15 minutes nightly while pain/swelling persists.  Imaging/Tests: 2025 Ankle radiographs reviewed  Rehabilitation: None at this time.  Ankle ROM exercises, calf stretching and strengthening after immobilization to regain full dorsiflexion and peroneal muscle strength.  Orthotics/Bracing: Continue walking fracture boot  Medication: Tylenol 500-1000mg (up to three times per day) and ibuprofen 600mg (up to three times per day) as needed for pain and swelling. Always take ibuprofen with food.    Follow-up Plan: 4 weeks        CLINIC LOCATIONS:     [place] [facility] REC[facility]:  [phone]    [address], [address] [place] HELP LINE: [phone]   [place] [address] TRIAGE LINE: [phone]   (Monday & Friday) APPOINTMENTS: [phone]    RADIOLOGY: [phone]   [place] [facility] [phone]   [address] PHYSICAL & OCCUPATIONAL THERAPY: [phone]*   [place] [address] BILLING QUESTIONS: [phone]   (Tuesday & Thursday) FAX: [phone]       *Therapy locations that offer Saturday options: [place], [place], [place]    Thank you for choosing [facility] Sports Medicine!    If you have any questions, please do not hesitate to reach out on [employer] or Call [phone] and ask for my team.    Josué Forrester MD, [facility] Orthopedics and Sports Medicine

## 2025-04-11 NOTE — LETTER
2025    Sonny Deluca   1980        To Whom it May Concern;    Sonny Deluca was seen in our clinic for a healthcare visit on 2025.  Due to medical injury he is to remain non weightbearing on the right lower extremity for at least 4 weeks.  It is medically necessary that he be allowed time off for healing and treatment needs, allow remote work where able, if he goes into work allow extra time to travel and frequent breaks as needed.   These restrictions are to be in place until further physician clearance.  He will be following up again in 4 weeks.        Sincerely,        Josué Forrester MD

## 2025-05-09 ENCOUNTER — OFFICE VISIT (OUTPATIENT)
Dept: ORTHOPEDICS | Facility: CLINIC | Age: 45
End: 2025-05-09
Payer: COMMERCIAL

## 2025-05-09 ENCOUNTER — ANCILLARY PROCEDURE (OUTPATIENT)
Dept: GENERAL RADIOLOGY | Facility: CLINIC | Age: 45
End: 2025-05-09
Attending: STUDENT IN AN ORGANIZED HEALTH CARE EDUCATION/TRAINING PROGRAM
Payer: COMMERCIAL

## 2025-05-09 ENCOUNTER — MYC MEDICAL ADVICE (OUTPATIENT)
Dept: ORTHOPEDICS | Facility: CLINIC | Age: 45
End: 2025-05-09

## 2025-05-09 DIAGNOSIS — S82.64XA NONDISPLACED FRACTURE OF LATERAL MALLEOLUS OF RIGHT FIBULA, INITIAL ENCOUNTER FOR CLOSED FRACTURE: ICD-10-CM

## 2025-05-09 DIAGNOSIS — S82.61XD DISPLACED FRACTURE OF LATERAL MALLEOLUS OF RIGHT FIBULA, SUBSEQUENT ENCOUNTER FOR CLOSED FRACTURE WITH ROUTINE HEALING: Primary | ICD-10-CM

## 2025-05-09 PROCEDURE — 29405 APPL SHORT LEG CAST: CPT | Mod: RT | Performed by: STUDENT IN AN ORGANIZED HEALTH CARE EDUCATION/TRAINING PROGRAM

## 2025-05-09 PROCEDURE — 99417 PROLNG OP E/M EACH 15 MIN: CPT | Performed by: STUDENT IN AN ORGANIZED HEALTH CARE EDUCATION/TRAINING PROGRAM

## 2025-05-09 PROCEDURE — 99215 OFFICE O/P EST HI 40 MIN: CPT | Mod: 25 | Performed by: STUDENT IN AN ORGANIZED HEALTH CARE EDUCATION/TRAINING PROGRAM

## 2025-05-09 PROCEDURE — 73610 X-RAY EXAM OF ANKLE: CPT | Mod: TC | Performed by: RADIOLOGY

## 2025-05-09 NOTE — PATIENT INSTRUCTIONS
Sonny Deluca, It was nice to see you in our office today.      DIAGNOSIS:   1. Displaced fracture of lateral malleolus of right fibula, subsequent encounter for closed fracture with routine healing        CLINIC LOCATIONS:     [place] [facility] RECORDS:  [phone]    [address], [address] [employer] HELP LINE: [phone]   [place] [address] TRIAGE LINE: [phone]   (Monday & Friday) APPOINTMENTS: [phone]    RADIOLOGY: [phone]   [place] [facility]/CT [phone] [address] PHYSICAL & OCCUPATIONAL THERAPY: [phone]*   [place] [address] BILLING QUESTIONS: [phone]   (Tuesday & Thursday) FAX: [phone]       *Therapy locations that offer Saturday options: [place], [place], [place]    Thank you for choosing [facility] Sports Medicine!    If you have any questions, please do not hesitate to reach out on [employer] or Call [phone] and ask for my team.       Caring for Your Cast     A cast is used to protect an injured body part and allow it to heal by limiting the amount of motion occurring around the injury. Pain and swelling of the injured area is normal for 48 hours after your cast is put on. If you have swelling, wiggle your toes or fingers to ease it. Doing so encourages blood flow to your arm or leg.     It is important that you keep your cast dry, unless your doctor tells you differently. If the padding of the cast gets wet, your skin may be damaged and become infected. When showering or taking a bath, put the cast in a heavy plastic bag that can be held in place with a rubber band. If your cast gets wet and does not dry out in four to five hours, call your doctor s office.   To keep the cast clean, use wash clothes or baby wipes around it.   You may experience some itching inside the cast. This is normal. Avoid putting anything in the cast, even your finger, as you can injure your skin and cause infection. Try shaking some talcum powder  or blowing cool air from a hair dryer into the cast to ease itching.   If these signs or symptoms develop, call your doctor immediately.      Pain gets worse    Swelling that cuts off blood flow that does not go away, even when you lift the body part above the level of your heart    Fever after itching. It may be related to an infection.    Fluid draining from your skin under the cast     Your cast may become loose as swelling goes down. If the cast feels too loose or if it is so loose you can take it off, call your doctor s office.     Your doctor or  will give you recommendations for activity based on your injury. Some sports allow casts if properly padded by a doctor or .     For complete healing, your cast should only be removed at the direction of your doctor or clinic staff. A special saw ensures its safe removal and protects the skin and other tissue under the cast.     Josué Forrester MD, [facility] Orthopedics and Sports Medicine

## 2025-05-09 NOTE — PROGRESS NOTES
SPORTS MEDICINE CLINIC FOLLOW-UP PATIENT VISIT    HISTORY OF PRESENT ILLNESS  Sonny Deluca is a very pleasant 44 year old male for follow-up right lateral ankle pain after falling down stairs rolling his ankle on 4/8/2025 sustaining a nondisplaced lateral malleolus fracture supported by clinical and radiographic assessment.     Previous Visit (4/11/25)   At last visit, patient was placed in a walking fracture boot, and would continue with tylenol and ibuprofen PRN. Patient to follow up in 4 weeks     Today (5/9/2025)  Since last visit, he does note some pain, but has improved since last visit. He does not notice pain with WB, right now he notes pain with twisting, flexion and extension.  He admits that he has note been consistently wearing his boot during the day, indeed he states he didn't wear it much while working from home and walking around yesterday.    Previous Treatments:  -Medications: Ibuprofen  -Rehabilitation: None  -Durable Medical Equipment: Boot, Crutches  -Injections: None  -Modalities: None  -Other Providers seen: Bernie    Sports, Hobbies, Employment:  Cycling, [employer], Walking,  - Desk worker    Average hours of sleep per night: 7     Average minutes of exercise per day: Daily 30-60    Area of Problem  4/11/2025 5/9/2025 Date 3 Date 4 Date 5   Function Ability in last week - % of Baseline (0 is worst & 100 is best) 100 100      Sport/Activity Ability in last week - % of Baseline (0 is worst & 100 is best) 0 N/A      Pain Level in the last week (0 is best & 10 is worst) 7  2        Additional Information of consideration:  -Poor Balance? None  -Numbness/paresthesias in extremities? None    MEDICATIONS    Current Outpatient Medications:     cephALEXin (KEFLEX) 500 MG capsule, Take 1 capsule (500 mg) by mouth 3 times daily., Disp: 21 capsule, Rfl: 0    ALLERGIES  No Known Allergies    PAST MEDICAL HISTORY  Past Medical History:   Diagnosis Date    NO ACTIVE PROBLEMS        PAST  "SURGICAL HISTORY  Past Surgical History:   Procedure Laterality Date    IR SINOGRAM INJECTION DIAGNOSTIC  4/4/2023    IR SINOGRAM INJECTION DIAGNOSTIC  4/11/2023    IR SINOGRAM INJECTION DIAGNOSTIC  4/21/2023    IR SINOGRAM INJECTION THERAPEUTIC  3/7/2023    IR SINOGRAM INJECTION THERAPEUTIC  3/14/2023    IR SINOGRAM INJECTION THERAPEUTIC  3/21/2023    IR SKIN SUBQ/SEROMA ABSCESS DRAIN  3/1/2023    NO HISTORY OF SURGERY      SPLENECTOMY, ROBOT-ASSISTED, LAPAROSCOPIC, USING DA ZANA XI N/A 10/22/2024    Procedure: Robotic splenic cyst fenestration;  Surgeon: Jh Centeno MD;  Location:  OR       SOCIAL HISTORY  Social History     Tobacco Use    Smoking status: Never    Smokeless tobacco: Never   Vaping Use    Vaping status: Never Used   Substance Use Topics    Alcohol use: No    Drug use: No        FAMILY HISTORY  Family History   Problem Relation Age of Onset    Alcohol/Drug Father     Depression Mother         medicated    Cancer Paternal Grandfather         stomach    Family History Negative Sister        REVIEW OF SYSTEMS  Complete 12 system Review of Systems performed and was negative except for HPI.    VITALS  Estimated body mass index is 29.27 kg/m  as calculated from the following:    Height as of 4/9/25: 1.778 m (5' 10\").    Weight as of 4/9/25: 92.5 kg (204 lb).    PHYSICAL EXAMINATION   General: Age appropriate appearing, no acute distress  HEENT: normocephalic, atraumatic, sclera non-icteric  Skin: No open skin lesion noted in visible areas.  Respiratory: Non labored breathing, No wheezes.  Cardiac/Vessels: No edema, cyanosis, clubbing noted in all extremities.  Lymph: No palpable lymph node swelling noted around the affected area.  Mental: There was no signs of aberrant behaviors noted. Patient was pleasant throughout the encounter.    Functional Movements: Right walking boot in place    ANKLE/FOOT:   Inspection: Right lateral ankle swelling  Palpation: TTP right lateral malleolus    IMAGING " "STUDIES:  5/9/2025 Right ankle x-ray: \" Minimally displaced oblique fracture of the distal fibula at the level of the metaphysis. Relative to the prior examination, there is minimal new fracture displacement (2 mm). Small amount of immature callus along the fracture lines, but no   solid bridging bone yet visualized. No other new or significant bone or joint abnormality. Joint alignment remains normal. The ankle mortise remains symmetric. Persistent soft tissue swelling in the ankle.\"    4/9/2025 Right ankle x-ray: \"Acute oblique nondisplaced fracture of the lateral malleolus with moderate overlying soft tissue swelling. No additional fractures. The ankle mortise is intact. \"    I personally reviewed these images and agree with the radiology report and shared the findings with the patient.    IMPRESSION  Sonny Deluca is a very pleasant 44 year old male for evaluation of right lateral ankle pain after falling down stairs rolling his ankle on 4/8/2025 sustaining a nondisplaced lateral malleolus fracture supported by clinical and radiographic assessment. He was placed in a walking fracture boot until today but does endorse poor compliance with consistently wearing the boot during the day and radiographic assessment unfortunately reveals new 2mm displacement.  Patient previously advised that immobilization typically takes 4-8 weeks.    PLAN  The following was discussed with the patient:  Activity Modification: Immobilization in a cast.  Imaging/Tests: updated Ankle radiographs reviewed as noted above  Rehabilitation: None at this time.  Ankle ROM exercises, calf stretching and strengthening after immobilization to regain full dorsiflexion and peroneal muscle strength.  Orthotics/Bracing: Cast placed at today's visit.  See application note below for further details.  Medication: Tylenol or Ibuprofen as needed  Follow-up Plan: 2 weeks    Cast/splint application    Date/Time: 5/9/2025 5:57 PM    Performed by: Usama " Josué CARRANZA MD  Authorized by: Josué Forrester MD    Consent:     Consent obtained:  Verbal    Consent given by:  Patient    Alternatives discussed:  No treatment  Pre-procedure details:     Sensation:  Normal  Procedure details:     Laterality:  Right    Location:  Ankle    Ankle:  R ankle    Cast type:  Short leg    Supplies:  Fiberglass  Post-procedure details:     Pain:  Unchanged    Sensation:  Normal    Patient tolerance of procedure:  Tolerated well, no immediate complications    Patient provided with cast or splint care instructions: Yes      They were encouraged to message me on [employer] whenever they needed.    The patient was in agreement with this plan. All questions were answered to the best of my ability.    Total time (face-to-face and non-face-to-face) spent on today's visit was 55 minutes. This included preparation for the visit (i.e. reviewing test results), performance of a medically appropriate history and examination, placing orders for medications, tests or other procedures, and discussing the plan of care with the patient. This time is exclusive of procedures performed and time spent teaching.      Josué Forrester MD, [facility]  Sports Medicine Attending Physician  Department of Physical Medicine & Rehabilitation

## 2025-05-09 NOTE — LETTER
5/9/2025      Sonny Deluca  [address] [facility] [address]      Dear Colleague,    Thank you for referring your patient, Sonny Deluca, to the [facility] SPORTS [facility]IN[facility] [place]. Please see a copy of my visit note below.    SPORTS MEDICINE CLINIC FOLLOW-UP PATIENT VISIT    HISTORY OF PRESENT ILLNESS  Sonny Deluca is a very pleasant 44 year old male for follow-up right lateral ankle pain after falling down stairs rolling his ankle on 4/8/2025 sustaining a nondisplaced lateral malleolus fracture supported by clinical and radiographic assessment.     Previous Visit (4/11/25)   At last visit, patient was placed in a walking fracture boot, and would continue with tylenol and ibuprofen PRN. Patient to follow up in 4 weeks     Today (5/9/2025)  Since last visit, he does note some pain, but has improved since last visit. He does not notice pain with WB, right now he notes pain with twisting, flexion and extension.  He admits that he has note been consistently wearing his boot during the day, indeed he states he didn't wear it much while working from home and walking around yesterday.    Previous Treatments:  -Medications: Ibuprofen  -Rehabilitation: None  -Durable Medical Equipment: Boot, Crutches  -Injections: None  -Modalities: None  -Other Providers seen: Bernie    Sports, Hobbies, Employment:  Cycling, [employer], Walking,  - Desk worker    Average hours of sleep per night: 7     Average minutes of exercise per day: Daily 30-60    Area of Problem  4/11/2025 5/9/2025 Date 3 Date 4 Date 5   Function Ability in last week - % of Baseline (0 is worst & 100 is best) 100 100      Sport/Activity Ability in last week - % of Baseline (0 is worst & 100 is best) 0 N/A      Pain Level in the last week (0 is best & 10 is worst) 7  2        Additional Information of consideration:  -Poor Balance? None  -Numbness/paresthesias in extremities? None    MEDICATIONS    Current Outpatient Medications:  "     cephALEXin (KEFLEX) 500 MG capsule, Take 1 capsule (500 mg) by mouth 3 times daily., Disp: 21 capsule, Rfl: 0    ALLERGIES  No Known Allergies    PAST MEDICAL HISTORY  Past Medical History:   Diagnosis Date     NO ACTIVE PROBLEMS        PAST SURGICAL HISTORY  Past Surgical History:   Procedure Laterality Date     IR SINOGRAM INJECTION DIAGNOSTIC  4/4/2023     IR SINOGRAM INJECTION DIAGNOSTIC  4/11/2023     IR SINOGRAM INJECTION DIAGNOSTIC  4/21/2023     IR SINOGRAM INJECTION THERAPEUTIC  3/7/2023     IR SINOGRAM INJECTION THERAPEUTIC  3/14/2023     IR SINOGRAM INJECTION THERAPEUTIC  3/21/2023     IR SKIN SUBQ/SEROMA ABSCESS DRAIN  3/1/2023     NO HISTORY OF SURGERY       SPLENECTOMY, ROBOT-ASSISTED, LAPAROSCOPIC, USING DA ZANA XI N/A 10/22/2024    Procedure: Robotic splenic cyst fenestration;  Surgeon: Jh Centeno MD;  Location:  OR       SOCIAL HISTORY  Social History     Tobacco Use     Smoking status: Never     Smokeless tobacco: Never   Vaping Use     Vaping status: Never Used   Substance Use Topics     Alcohol use: No     Drug use: No        FAMILY HISTORY  Family History   Problem Relation Age of Onset     Alcohol/Drug Father      Depression Mother         medicated     Cancer Paternal Grandfather         stomach     Family History Negative Sister        REVIEW OF SYSTEMS  Complete 12 system Review of Systems performed and was negative except for HPI.    VITALS  Estimated body mass index is 29.27 kg/m  as calculated from the following:    Height as of 4/9/25: 1.778 m (5' 10\").    Weight as of 4/9/25: 92.5 kg (204 lb).    PHYSICAL EXAMINATION   General: Age appropriate appearing, no acute distress  HEENT: normocephalic, atraumatic, sclera non-icteric  Skin: No open skin lesion noted in visible areas.  Respiratory: Non labored breathing, No wheezes.  Cardiac/Vessels: No edema, cyanosis, clubbing noted in all extremities.  Lymph: No palpable lymph node swelling noted around the affected area.  Mental: " "There was no signs of aberrant behaviors noted. Patient was pleasant throughout the encounter.    Functional Movements: Right walking boot in place    ANKLE/FOOT:   Inspection: Right lateral ankle swelling  Palpation: TTP right lateral malleolus    IMAGING STUDIES:  5/9/2025 Right ankle x-ray: \" Minimally displaced oblique fracture of the distal fibula at the level of the metaphysis. Relative to the prior examination, there is minimal new fracture displacement (2 mm). Small amount of immature callus along the fracture lines, but no   solid bridging bone yet visualized. No other new or significant bone or joint abnormality. Joint alignment remains normal. The ankle mortise remains symmetric. Persistent soft tissue swelling in the ankle.\"    4/9/2025 Right ankle x-ray: \"Acute oblique nondisplaced fracture of the lateral malleolus with moderate overlying soft tissue swelling. No additional fractures. The ankle mortise is intact. \"    I personally reviewed these images and agree with the radiology report and shared the findings with the patient.    IMPRESSION  Sonny Deluca is a very pleasant 44 year old male for evaluation of right lateral ankle pain after falling down stairs rolling his ankle on 4/8/2025 sustaining a nondisplaced lateral malleolus fracture supported by clinical and radiographic assessment. He was placed in a walking fracture boot until today but does endorse poor compliance with consistently wearing the boot during the day and radiographic assessment unfortunately reveals new 2mm displacement.  Patient previously advised that immobilization typically takes 4-8 weeks.    PLAN  The following was discussed with the patient:  Activity Modification: Immobilization in a cast.  Imaging/Tests: updated Ankle radiographs reviewed as noted above  Rehabilitation: None at this time.  Ankle ROM exercises, calf stretching and strengthening after immobilization to regain full dorsiflexion and peroneal muscle " strength.  Orthotics/Bracing: Cast placed at today's visit.  See application note below for further details.  Medication: Tylenol or Ibuprofen as needed  Follow-up Plan: 2 weeks    Cast/splint application    Date/Time: 5/9/2025 5:57 PM    Performed by: Josué Forrester MD  Authorized by: Josué Forrester MD    Consent:     Consent obtained:  Verbal    Consent given by:  Patient    Alternatives discussed:  No treatment  Pre-procedure details:     Sensation:  Normal  Procedure details:     Laterality:  Right    Location:  Ankle    Ankle:  R ankle    Cast type:  Short leg    Supplies:  Fiberglass  Post-procedure details:     Pain:  Unchanged    Sensation:  Normal    Patient tolerance of procedure:  Tolerated well, no immediate complications    Patient provided with cast or splint care instructions: Yes      They were encouraged to message me on [employer] whenever they needed.    The patient was in agreement with this plan. All questions were answered to the best of my ability.    Total time (face-to-face and non-face-to-face) spent on today's visit was 55 minutes. This included preparation for the visit (i.e. reviewing test results), performance of a medically appropriate history and examination, placing orders for medications, tests or other procedures, and discussing the plan of care with the patient. This time is exclusive of procedures performed and time spent teaching.      Josué Forrester MD, [facility]  Sports Medicine Attending Physician  Department of Physical Medicine & Rehabilitation    Again, thank you for allowing me to participate in the care of your patient.        Sincerely,        Josué Forrester MD    Electronically signed

## 2025-05-23 ENCOUNTER — OFFICE VISIT (OUTPATIENT)
Dept: ORTHOPEDICS | Facility: CLINIC | Age: 45
End: 2025-05-23
Payer: COMMERCIAL

## 2025-05-23 ENCOUNTER — ANCILLARY PROCEDURE (OUTPATIENT)
Dept: GENERAL RADIOLOGY | Facility: CLINIC | Age: 45
End: 2025-05-23
Attending: STUDENT IN AN ORGANIZED HEALTH CARE EDUCATION/TRAINING PROGRAM
Payer: COMMERCIAL

## 2025-05-23 DIAGNOSIS — S82.61XD DISPLACED FRACTURE OF LATERAL MALLEOLUS OF RIGHT FIBULA, SUBSEQUENT ENCOUNTER FOR CLOSED FRACTURE WITH ROUTINE HEALING: Primary | ICD-10-CM

## 2025-05-23 DIAGNOSIS — S82.61XD DISPLACED FRACTURE OF LATERAL MALLEOLUS OF RIGHT FIBULA, SUBSEQUENT ENCOUNTER FOR CLOSED FRACTURE WITH ROUTINE HEALING: ICD-10-CM

## 2025-05-23 PROCEDURE — 73610 X-RAY EXAM OF ANKLE: CPT | Mod: TC | Performed by: RADIOLOGY

## 2025-05-23 NOTE — LETTER
May 23, 2025      Sonny Deluca  [address] [place] [id number]        To Whom It May Concern:    Sonny Deluca was seen in our clinic. Due to medical injury he is to remain non weightbearing on the right lower extremity for at least 2 additional weeks. It is medically necessary that he be allowed time off for healing and treatment needs, allow remote work where able, if he goes into work allow extra time to travel and frequent breaks as needed. These restrictions are to be in place until further physician clearance.      Workability will be reassessed on 6/6/2025.          Sincerely,      Josué Forrester MD    Electronically signed

## 2025-05-23 NOTE — PROGRESS NOTES
SPORTS MEDICINE CLINIC FOLLOW-UP PATIENT VISIT    HISTORY OF PRESENT ILLNESS  Sonny Deluca is a very pleasant 44 year old male for follow-up right lateral ankle pain after falling down stairs rolling his ankle on 4/8/2025 sustaining a nondisplaced lateral malleolus fracture supported by clinical and radiographic assessment.     Previous Visit (4/11/25)   At last visit, patient was placed in a walking fracture boot, and would continue with tylenol and ibuprofen PRN. Patient to follow up in 4 weeks     Today (5/9/2025)  Since last visit, he does note some pain, but has improved since last visit. He does not notice pain with WB, right now he notes pain with twisting, flexion and extension.  He admits that he has note been consistently wearing his boot during the day, indeed he states he didn't wear it much while working from home and walking around yesterday.    Previous Treatments:  -Medications: Ibuprofen  -Rehabilitation: None  -Durable Medical Equipment: Boot, Crutches  -Injections: None  -Modalities: None  -Other Providers seen: Bernie    Sports, Hobbies, Employment:  Cycling, [employer], Walking,  - Desk worker    Average hours of sleep per night: 7     Average minutes of exercise per day: Daily 30-60    Area of Problem  4/11/2025 5/9/2025 5/23/2025 Date 4 Date 5   Function Ability in last week - % of Baseline (0 is worst & 100 is best) 100 100 100     Sport/Activity Ability in last week - % of Baseline (0 is worst & 100 is best) 0 N/A N/A     Pain Level in the last week (0 is best & 10 is worst) 7  2 0       Additional Information of consideration:  -Poor Balance? None  -Numbness/paresthesias in extremities? None    MEDICATIONS    Current Outpatient Medications:     cephALEXin (KEFLEX) 500 MG capsule, Take 1 capsule (500 mg) by mouth 3 times daily., Disp: 21 capsule, Rfl: 0    ALLERGIES  No Known Allergies    PAST MEDICAL HISTORY  Past Medical History:   Diagnosis Date    NO ACTIVE PROBLEMS   "      PAST SURGICAL HISTORY  Past Surgical History:   Procedure Laterality Date    IR SINOGRAM INJECTION DIAGNOSTIC  4/4/2023    IR SINOGRAM INJECTION DIAGNOSTIC  4/11/2023    IR SINOGRAM INJECTION DIAGNOSTIC  4/21/2023    IR SINOGRAM INJECTION THERAPEUTIC  3/7/2023    IR SINOGRAM INJECTION THERAPEUTIC  3/14/2023    IR SINOGRAM INJECTION THERAPEUTIC  3/21/2023    IR SKIN SUBQ/SEROMA ABSCESS DRAIN  3/1/2023    NO HISTORY OF SURGERY      SPLENECTOMY, ROBOT-ASSISTED, LAPAROSCOPIC, USING DA ZANA XI N/A 10/22/2024    Procedure: Robotic splenic cyst fenestration;  Surgeon: Jh Centeno MD;  Location:  OR       SOCIAL HISTORY  Social History     Tobacco Use    Smoking status: Never    Smokeless tobacco: Never   Vaping Use    Vaping status: Never Used   Substance Use Topics    Alcohol use: No    Drug use: No        FAMILY HISTORY  Family History   Problem Relation Age of Onset    Alcohol/Drug Father     Depression Mother         medicated    Cancer Paternal Grandfather         stomach    Family History Negative Sister        REVIEW OF SYSTEMS  Complete 12 system Review of Systems performed and was negative except for HPI.    VITALS  Estimated body mass index is 29.27 kg/m  as calculated from the following:    Height as of 4/9/25: 1.778 m (5' 10\").    Weight as of 4/9/25: 92.5 kg (204 lb).    PHYSICAL EXAMINATION   General: Age appropriate appearing, no acute distress  HEENT: normocephalic, atraumatic, sclera non-icteric  Skin: No open skin lesion noted in visible areas.  Respiratory: Non labored breathing, No wheezes.  Cardiac/Vessels: No edema, cyanosis, clubbing noted in all extremities.  Lymph: No palpable lymph node swelling noted around the affected area.  Mental: There was no signs of aberrant behaviors noted. Patient was pleasant throughout the encounter.    Functional Movements: Right walking boot in place    ANKLE/FOOT:   Inspection: No major right lateral ankle swelling  Palpation: No more TTP right lateral " "malleolus    IMAGING STUDIES:  5/23/2025 Right ankle x-ray: interval healing with minimal ***    5/9/2025 Right ankle x-ray: \" Minimally displaced oblique fracture of the distal fibula at the level of the metaphysis. Relative to the prior examination, there is minimal new fracture displacement (2 mm). Small amount of immature callus along the fracture lines, but no   solid bridging bone yet visualized. No other new or significant bone or joint abnormality. Joint alignment remains normal. The ankle mortise remains symmetric. Persistent soft tissue swelling in the ankle.\"    4/9/2025 Right ankle x-ray: \"Acute oblique nondisplaced fracture of the lateral malleolus with moderate overlying soft tissue swelling. No additional fractures. The ankle mortise is intact. \"    I personally reviewed these images and agree with the radiology report and shared the findings with the patient.    IMPRESSION  Sonny Deluca is a very pleasant 44 year old male for evaluation of right lateral ankle pain after falling down stairs rolling his ankle on 4/8/2025 sustaining a nondisplaced lateral malleolus fracture supported by clinical and radiographic assessment. He was placed in a walking fracture boot until today but does endorse poor compliance with consistently wearing the boot during the day and radiographic assessment unfortunately reveals new 2mm displacement.  Patient previously advised that immobilization typically takes 4-8 weeks.    PLAN  The following was discussed with the patient:  Activity Modification: Immobilization in a cast.  Imaging/Tests: updated Ankle radiographs reviewed as noted above  Rehabilitation: None at this time.  Ankle ROM exercises, calf stretching and strengthening after immobilization to regain full dorsiflexion and peroneal muscle strength.  Orthotics/Bracing: Cast placed at today's visit.  See application note below for further details.  Medication: Tylenol or Ibuprofen as needed  Follow-up Plan: 2 " weeks    Cast/splint application    Date/Time: 5/23/2025 12:58 PM    Performed by: Saleem Leija MA  Authorized by: Josué Forrester MD    Consent:     Consent obtained:  Verbal    Consent given by:  Patient    Risks discussed:  Discoloration    Alternatives discussed:  No treatment  Pre-procedure details:     Sensation:  Normal  Procedure details:     Laterality:  Right    Location:  Foot    Foot:  R foot    Cast type:  Short leg    Supplies:  Fiberglass  Post-procedure details:     Pain:  Unchanged    Pain level:  0/10    Sensation:  Normal    Patient tolerance of procedure:  Tolerated well, no immediate complications    Patient provided with cast or splint care instructions: Yes      They were encouraged to message me on [employer] whenever they needed.    The patient was in agreement with this plan. All questions were answered to the best of my ability.    Total time (face-to-face and non-face-to-face) spent on today's visit was 55 minutes. This included preparation for the visit (i.e. reviewing test results), performance of a medically appropriate history and examination, placing orders for medications, tests or other procedures, and discussing the plan of care with the patient. This time is exclusive of procedures performed and time spent teaching.      Josué Forrester MD, [facility]  Sports Medicine Attending Physician  Department of Physical Medicine & Rehabilitation

## 2025-05-23 NOTE — LETTER
May 23, 2025      Sonny Deluca  [address] [place] [id number]        To Whom It May Concern:    Sonny Deluca was seen in our clinic. Due to medical injury he is to remain non weightbearing on the right lower extremity for at least 2 additional weeks. It is medically necessary that he be allowed time off for healing and treatment needs, allow remote work where able, if he goes into work allow extra time to travel and frequent breaks as needed. These restrictions are to be in place until further physician clearance.      Workability will be reassessed on 6/6/2025.          Sincerely,      Josué Forrester        Electronically signed

## 2025-05-23 NOTE — Clinical Note
5/23/2025      Sonny Deluca  [address] [place] [id number]      Dear Colleague,    Thank you for referring your patient, Sonny Deluca, to the [facility]ORTS MEDICINE [facility]IN[facility] [place]. Please see a copy of my visit note below.    SPORTS MEDICINE CLINIC FOLLOW-UP PATIENT VISIT    HISTORY OF PRESENT ILLNESS  Sonny Deluca is a very pleasant 44 year old male for follow-up right lateral ankle pain after falling down stairs rolling his ankle on 4/8/2025 sustaining a nondisplaced lateral malleolus fracture supported by clinical and radiographic assessment.     Previous Visit (4/11/25)   At last visit, patient was placed in a walking fracture boot, and would continue with tylenol and ibuprofen PRN. Patient to follow up in 4 weeks     Today (5/9/2025)  Since last visit, he does note some pain, but has improved since last visit. He does not notice pain with WB, right now he notes pain with twisting, flexion and extension.  He admits that he has note been consistently wearing his boot during the day, indeed he states he didn't wear it much while working from home and walking around yesterday.    Previous Treatments:  -Medications: Ibuprofen  -Rehabilitation: None  -Durable Medical Equipment: Boot, Crutches  -Injections: None  -Modalities: None  -Other Providers seen: Bernie    Sports, Hobbies, Employment:  Cycling, [employer], Walking,  - Desk worker    Average hours of sleep per night: 7     Average minutes of exercise per day: Daily 30-60    Area of Problem  4/11/2025 5/9/2025 Date 3 Date 4 Date 5   Function Ability in last week - % of Baseline (0 is worst & 100 is best) 100 100      Sport/Activity Ability in last week - % of Baseline (0 is worst & 100 is best) 0 N/A      Pain Level in the last week (0 is best & 10 is worst) 7  2        Additional Information of consideration:  -Poor Balance? None  -Numbness/paresthesias in extremities? None    MEDICATIONS    Current Outpatient Medications:  "    cephALEXin (KEFLEX) 500 MG capsule, Take 1 capsule (500 mg) by mouth 3 times daily., Disp: 21 capsule, Rfl: 0    ALLERGIES  No Known Allergies    PAST MEDICAL HISTORY  Past Medical History:   Diagnosis Date    NO ACTIVE PROBLEMS        PAST SURGICAL HISTORY  Past Surgical History:   Procedure Laterality Date    IR SINOGRAM INJECTION DIAGNOSTIC  4/4/2023    IR SINOGRAM INJECTION DIAGNOSTIC  4/11/2023    IR SINOGRAM INJECTION DIAGNOSTIC  4/21/2023    IR SINOGRAM INJECTION THERAPEUTIC  3/7/2023    IR SINOGRAM INJECTION THERAPEUTIC  3/14/2023    IR SINOGRAM INJECTION THERAPEUTIC  3/21/2023    IR SKIN SUBQ/SEROMA ABSCESS DRAIN  3/1/2023    NO HISTORY OF SURGERY      SPLENECTOMY, ROBOT-ASSISTED, LAPAROSCOPIC, USING DA ZANA XI N/A 10/22/2024    Procedure: Robotic splenic cyst fenestration;  Surgeon: Jh Centeno MD;  Location:  OR       SOCIAL HISTORY  Social History     Tobacco Use    Smoking status: Never    Smokeless tobacco: Never   Vaping Use    Vaping status: Never Used   Substance Use Topics    Alcohol use: No    Drug use: No        FAMILY HISTORY  Family History   Problem Relation Age of Onset    Alcohol/Drug Father     Depression Mother         medicated    Cancer Paternal Grandfather         stomach    Family History Negative Sister        REVIEW OF SYSTEMS  Complete 12 system Review of Systems performed and was negative except for HPI.    VITALS  Estimated body mass index is 29.27 kg/m  as calculated from the following:    Height as of 4/9/25: 1.778 m (5' 10\").    Weight as of 4/9/25: 92.5 kg (204 lb).    PHYSICAL EXAMINATION   General: Age appropriate appearing, no acute distress  HEENT: normocephalic, atraumatic, sclera non-icteric  Skin: No open skin lesion noted in visible areas.  Respiratory: Non labored breathing, No wheezes.  Cardiac/Vessels: No edema, cyanosis, clubbing noted in all extremities.  Lymph: No palpable lymph node swelling noted around the affected area.  Mental: There was no signs of " "aberrant behaviors noted. Patient was pleasant throughout the encounter.    Functional Movements: Right walking boot in place    ANKLE/FOOT:   Inspection: Right lateral ankle swelling  Palpation: TTP right lateral malleolus    IMAGING STUDIES:  5/9/2025 Right ankle x-ray: \" Minimally displaced oblique fracture of the distal fibula at the level of the metaphysis. Relative to the prior examination, there is minimal new fracture displacement (2 mm). Small amount of immature callus along the fracture lines, but no   solid bridging bone yet visualized. No other new or significant bone or joint abnormality. Joint alignment remains normal. The ankle mortise remains symmetric. Persistent soft tissue swelling in the ankle.\"    4/9/2025 Right ankle x-ray: \"Acute oblique nondisplaced fracture of the lateral malleolus with moderate overlying soft tissue swelling. No additional fractures. The ankle mortise is intact. \"    I personally reviewed these images and agree with the radiology report and shared the findings with the patient.    IMPRESSION  Sonny Deluca is a very pleasant 44 year old male for evaluation of right lateral ankle pain after falling down stairs rolling his ankle on 4/8/2025 sustaining a nondisplaced lateral malleolus fracture supported by clinical and radiographic assessment. He was placed in a walking fracture boot until today but does endorse poor compliance with consistently wearing the boot during the day and radiographic assessment unfortunately reveals new 2mm displacement.  Patient previously advised that immobilization typically takes 4-8 weeks.    PLAN  The following was discussed with the patient:  Activity Modification: Immobilization in a cast.  Imaging/Tests: updated Ankle radiographs reviewed as noted above  Rehabilitation: None at this time.  Ankle ROM exercises, calf stretching and strengthening after immobilization to regain full dorsiflexion and peroneal muscle " strength.  Orthotics/Bracing: Cast placed at today's visit.  See application note below for further details.  Medication: Tylenol or Ibuprofen as needed  Follow-up Plan: 2 weeks    Procedures  They were encouraged to message me on [employer] whenever they needed.    The patient was in agreement with this plan. All questions were answered to the best of my ability.    Total time (face-to-face and non-face-to-face) spent on today's visit was 55 minutes. This included preparation for the visit (i.e. reviewing test results), performance of a medically appropriate history and examination, placing orders for medications, tests or other procedures, and discussing the plan of care with the patient. This time is exclusive of procedures performed and time spent teaching.      Josué Forrester MD, [facility]  Sports Medicine Attending Physician  Department of Physical Medicine & Rehabilitation      Again, thank you for allowing me to participate in the care of your patient.        Sincerely,        Josué Forrester MD    Electronically signed

## 2025-06-06 ENCOUNTER — OFFICE VISIT (OUTPATIENT)
Dept: ORTHOPEDICS | Facility: CLINIC | Age: 45
End: 2025-06-06
Payer: COMMERCIAL

## 2025-06-06 ENCOUNTER — ANCILLARY PROCEDURE (OUTPATIENT)
Dept: GENERAL RADIOLOGY | Facility: CLINIC | Age: 45
End: 2025-06-06
Attending: STUDENT IN AN ORGANIZED HEALTH CARE EDUCATION/TRAINING PROGRAM
Payer: COMMERCIAL

## 2025-06-06 ENCOUNTER — MEDICAL CORRESPONDENCE (OUTPATIENT)
Dept: HEALTH INFORMATION MANAGEMENT | Facility: CLINIC | Age: 45
End: 2025-06-06

## 2025-06-06 DIAGNOSIS — S82.61XD DISPLACED FRACTURE OF LATERAL MALLEOLUS OF RIGHT FIBULA, SUBSEQUENT ENCOUNTER FOR CLOSED FRACTURE WITH ROUTINE HEALING: ICD-10-CM

## 2025-06-06 DIAGNOSIS — S82.61XD DISPLACED FRACTURE OF LATERAL MALLEOLUS OF RIGHT FIBULA, SUBSEQUENT ENCOUNTER FOR CLOSED FRACTURE WITH ROUTINE HEALING: Primary | ICD-10-CM

## 2025-06-06 PROCEDURE — 73610 X-RAY EXAM OF ANKLE: CPT | Mod: TC | Performed by: RADIOLOGY

## 2025-06-06 PROCEDURE — 99214 OFFICE O/P EST MOD 30 MIN: CPT | Performed by: STUDENT IN AN ORGANIZED HEALTH CARE EDUCATION/TRAINING PROGRAM

## 2025-06-06 SDOH — HEALTH STABILITY: PHYSICAL HEALTH: ON AVERAGE, HOW MANY DAYS PER WEEK DO YOU ENGAGE IN MODERATE TO STRENUOUS EXERCISE (LIKE A BRISK WALK)?: 0 DAYS

## 2025-06-06 NOTE — PATIENT INSTRUCTIONS
Sonny Deluca, It was nice to see you in our office today.      DIAGNOSIS:   1. Displaced fracture of lateral malleolus of right fibula, subsequent encounter for closed fracture with routine healing      INSTRUCTIONS FOR FOLLOW-UP CARE:  Activity Modification: Discontinue cast, activity as tolerated  Imaging/Tests: x-rays reviewed  Rehabilitation: PT recommended, referral placed to work on ankle ROM, calf stretching and strengthening, emphasizing gaining full dorsiflexion and peroneal muscle strength  Orthotics/Bracing: ankle lace up brace as needed  Medication: Tylenol or ibuprofen as needed  Interventions: cast removed  Follow-up Plan: 2.5 weeks        CLINIC LOCATIONS:     [place] [facility]:  [phone]    [address], [address] [facility] HELP LINE: [phone]   [place] [address] TRIAGE LINE: [phone]   (Monday & Friday) APPOINTMENTS: [phone]    RADIOLOGY: [phone]   [place] [facility] [phone]   [address] PHYSICAL & OCCUPATIONAL THERAPY: [phone]*   [place] [address] BILLING QUESTIONS: [phone]   (Tuesday & Thursday) FAX: [phone]       *Therapy locations that offer Saturday options: [place], [place], [place]    Thank you for choosing [facility]!    If you have any questions, please do not hesitate to reach out on [facility] or Call [phone] and ask for my team.    Josué Forrester MD, [facility]

## 2025-06-06 NOTE — LETTER
"6/6/2025      Sonny Deluca  [address] [facility] [address]      Dear Colleague,    Thank you for referring your patient, Sonny Deluca, to the [facility] [place]. Please see a copy of my visit note below.    SPORTS MEDICINE CLINIC FOLLOW-UP PATIENT VISIT    HISTORY OF PRESENT ILLNESS  Sonny Deluca is a very pleasant 44 year old male for follow-up evaluation of right lateral ankle pain after falling down stairs rolling his ankle on 4/8/2025 sustaining a nondisplaced lateral malleolus fracture supported by clinical and radiographic assessment. He was placed in a walking fracture boot initially for 4 weeks but did endorse poor compliance with consistently wearing the boot during the day and radiographic assessment unfortunately revealed new 2mm displacement.  He was placed in a cast for 2 weeks now, and advised that immobilization typically takes 4-8 weeks.    Previous Visit (5/23/2025)   At last visit, \"Activity Modification: continue Immobilization in a cast.  Imaging/Tests: updated Ankle radiographs reviewed as noted above  Rehabilitation: None at this time.  Ankle ROM exercises, calf stretching and strengthening after immobilization to regain full dorsiflexion and peroneal muscle strength.  Orthotics/Bracing: Cast replaced at today's visit.  See application note below for further details.  Medication: Tylenol or Ibuprofen as needed  Follow-up Plan: 2 weeks\"      Today (6/6/2025)  Since last visit, he reports pain improvement.    Previous Treatments:  -Medications: Ibuprofen  -Rehabilitation: None  -Durable Medical Equipment: Boot, Crutches  -Injections: None  -Modalities: None  -Other Providers seen: Burnosricardo    Sports, Hobbies, Employment:  Cycling, [employer], Walking,  - Desk worker    Average hours of sleep per night: 7     Average minutes of exercise per day: Daily 30-60    Area of Problem  4/11/2025 5/9/2025 5/23/2025 Date 4 Date 5   Function Ability in " "last week - % of Baseline (0 is worst & 100 is best) 100 100 100     Sport/Activity Ability in last week - % of Baseline (0 is worst & 100 is best) 0 N/A N/A     Pain Level in the last week (0 is best & 10 is worst) 7  2 0       Additional Information of consideration:  -Poor Balance? None  -Numbness/paresthesias in extremities? None    MEDICATIONS    Current Outpatient Medications:      cephALEXin (KEFLEX) 500 MG capsule, Take 1 capsule (500 mg) by mouth 3 times daily., Disp: 21 capsule, Rfl: 0    ALLERGIES  No Known Allergies    PAST MEDICAL HISTORY  Past Medical History:   Diagnosis Date     NO ACTIVE PROBLEMS        PAST SURGICAL HISTORY  Past Surgical History:   Procedure Laterality Date     IR SINOGRAM INJECTION DIAGNOSTIC  4/4/2023     IR SINOGRAM INJECTION DIAGNOSTIC  4/11/2023     IR SINOGRAM INJECTION DIAGNOSTIC  4/21/2023     IR SINOGRAM INJECTION THERAPEUTIC  3/7/2023     IR SINOGRAM INJECTION THERAPEUTIC  3/14/2023     IR SINOGRAM INJECTION THERAPEUTIC  3/21/2023     IR SKIN SUBQ/SEROMA ABSCESS DRAIN  3/1/2023     NO HISTORY OF SURGERY       SPLENECTOMY, ROBOT-ASSISTED, LAPAROSCOPIC, USING DA ZANA XI N/A 10/22/2024    Procedure: Robotic splenic cyst fenestration;  Surgeon: Jh Centeno MD;  Location:  OR       SOCIAL HISTORY  Social History     Tobacco Use     Smoking status: Never     Smokeless tobacco: Never   Vaping Use     Vaping status: Never Used   Substance Use Topics     Alcohol use: No     Drug use: No        FAMILY HISTORY  Family History   Problem Relation Age of Onset     Alcohol/Drug Father      Depression Mother         medicated     Cancer Paternal Grandfather         stomach     Family History Negative Sister        REVIEW OF SYSTEMS  Complete 12 system Review of Systems performed and was negative except for HPI.    VITALS  Estimated body mass index is 29.27 kg/m  as calculated from the following:    Height as of 4/9/25: 1.778 m (5' 10\").    Weight as of 4/9/25: 92.5 kg (204 " "lb).    PHYSICAL EXAMINATION   General: Age appropriate appearing, no acute distress  HEENT: normocephalic, atraumatic, sclera non-icteric  Skin: No open skin lesion noted in visible areas.  Respiratory: Non labored breathing, No wheezes.  Cardiac/Vessels: No edema, cyanosis, clubbing noted in all extremities.  Lymph: No palpable lymph node swelling noted around the affected area.  Mental: There was no signs of aberrant behaviors noted. Patient was pleasant throughout the encounter.    Functional Movements: Right walking boot in place    ANKLE/FOOT:   Inspection: No major right lateral ankle swelling  Palpation: No more TTP right lateral malleolus    IMAGING STUDIES:  6/6/2025 Right ankle radiographs: \"No significant interval change in the appearance of the fracture of the lateral malleolus. There is evidence of some developing periosteal new bone and callus formation but this was also present on the prior examination and has not significantly changed. No change in alignment. No new fractures are identified. The ankle mortise appears symmetric and unchanged.\"    5/23/2025 Right ankle x-ray: \"Early healing changes of the minimally displaced (2 mm) transverse oblique fracture through the distal fibula at the level of the tibiotalar joint line, with small amount of ossified callus. No additional fracture. Normal joint alignment. The   ankle mortise is symmetric. Moderate tibiotalar joint effusion. Otherwise negative.\"    5/9/2025 Right ankle x-ray: \" Minimally displaced oblique fracture of the distal fibula at the level of the metaphysis. Relative to the prior examination, there is minimal new fracture displacement (2 mm). Small amount of immature callus along the fracture lines, but no   solid bridging bone yet visualized. No other new or significant bone or joint abnormality. Joint alignment remains normal. The ankle mortise remains symmetric. Persistent soft tissue swelling in the ankle.\"    4/9/2025 Right ankle " "x-ray: \"Acute oblique nondisplaced fracture of the lateral malleolus with moderate overlying soft tissue swelling. No additional fractures. The ankle mortise is intact. \"    I personally reviewed these images and agree with the radiology report and shared the findings with the patient.    IMPRESSION  Sonny Deluca is a very pleasant 44 year old male for follow-up evaluation of right lateral ankle pain after falling down stairs rolling his ankle on 4/8/2025 sustaining a nondisplaced lateral malleolus fracture supported by clinical and radiographic assessment. He was placed in a walking fracture boot initially for 4 weeks but did endorse poor compliance with consistently wearing the boot during the day and radiographic assessment unfortunately revealed new 2mm displacement.  He is s/p 4 weeks of cast immobilization with clinical healing and radiographic evidence of callus formation.    PLAN  The following was discussed with the patient:  Activity Modification: Discontinue cast, activity as tolerated  Imaging/Tests: x-rays reviewed  Rehabilitation: PT recommended, referral placed to work on ankle ROM, calf stretching and strengthening, emphasizing gaining full dorsiflexion and peroneal muscle strength  Orthotics/Bracing: ankle lace up brace as needed  Medication: Tylenol or ibuprofen as needed  Interventions: cast removed  Follow-up Plan: 2.5 weeks    They were encouraged to message me on [employer] whenever they needed.    The patient was in agreement with this plan. All questions were answered to the best of my ability.    Total time (face-to-face and non-face-to-face) spent on today's visit was 30 minutes. This included preparation for the visit (i.e. reviewing test results), performance of a medically appropriate history and examination, placing orders for medications, tests or other procedures, and discussing the plan of care with the patient. This time is exclusive of procedures performed and " time spent teaching.      Josué Forrester MD, [facility]  Sports Medicine Attending Physician  Department of Physical Medicine & Rehabilitation    Again, thank you for allowing me to participate in the care of your patient.        Sincerely,        Josué Forrester MD    Electronically signed

## 2025-06-06 NOTE — PROGRESS NOTES
"SPORTS MEDICINE CLINIC FOLLOW-UP PATIENT VISIT    HISTORY OF PRESENT ILLNESS  Sonny Deluca is a very pleasant 44 year old male for follow-up evaluation of right lateral ankle pain after falling down stairs rolling his ankle on 4/8/2025 sustaining a nondisplaced lateral malleolus fracture supported by clinical and radiographic assessment. He was placed in a walking fracture boot initially for 4 weeks but did endorse poor compliance with consistently wearing the boot during the day and radiographic assessment unfortunately revealed new 2mm displacement.  He was placed in a cast for 2 weeks now, and advised that immobilization typically takes 4-8 weeks.    Previous Visit (5/23/2025)   At last visit, \"Activity Modification: continue Immobilization in a cast.  Imaging/Tests: updated Ankle radiographs reviewed as noted above  Rehabilitation: None at this time.  Ankle ROM exercises, calf stretching and strengthening after immobilization to regain full dorsiflexion and peroneal muscle strength.  Orthotics/Bracing: Cast replaced at today's visit.  See application note below for further details.  Medication: Tylenol or Ibuprofen as needed  Follow-up Plan: 2 weeks\"      Today (6/6/2025)  Since last visit, ***      Previous Treatments:  -Medications: Ibuprofen  -Rehabilitation: None  -Durable Medical Equipment: Boot, Crutches  -Injections: None  -Modalities: None  -Other Providers seen: Bernie    Sports, Hobbies, Employment:  Cycling, [employer], Walking,  - Desk worker    Average hours of sleep per night: 7     Average minutes of exercise per day: Daily 30-60    Area of Problem  4/11/2025 5/9/2025 5/23/2025 Date 4 Date 5   Function Ability in last week - % of Baseline (0 is worst & 100 is best) 100 100 100     Sport/Activity Ability in last week - % of Baseline (0 is worst & 100 is best) 0 N/A N/A     Pain Level in the last week (0 is best & 10 is worst) 7  2 0       Additional Information of " "consideration:  -Poor Balance? None  -Numbness/paresthesias in extremities? None    MEDICATIONS    Current Outpatient Medications:     cephALEXin (KEFLEX) 500 MG capsule, Take 1 capsule (500 mg) by mouth 3 times daily., Disp: 21 capsule, Rfl: 0    ALLERGIES  No Known Allergies    PAST MEDICAL HISTORY  Past Medical History:   Diagnosis Date    NO ACTIVE PROBLEMS        PAST SURGICAL HISTORY  Past Surgical History:   Procedure Laterality Date    IR SINOGRAM INJECTION DIAGNOSTIC  4/4/2023    IR SINOGRAM INJECTION DIAGNOSTIC  4/11/2023    IR SINOGRAM INJECTION DIAGNOSTIC  4/21/2023    IR SINOGRAM INJECTION THERAPEUTIC  3/7/2023    IR SINOGRAM INJECTION THERAPEUTIC  3/14/2023    IR SINOGRAM INJECTION THERAPEUTIC  3/21/2023    IR SKIN SUBQ/SEROMA ABSCESS DRAIN  3/1/2023    NO HISTORY OF SURGERY      SPLENECTOMY, ROBOT-ASSISTED, LAPAROSCOPIC, USING DA ZANA XI N/A 10/22/2024    Procedure: Robotic splenic cyst fenestration;  Surgeon: Jh Centeno MD;  Location:  OR       SOCIAL HISTORY  Social History     Tobacco Use    Smoking status: Never    Smokeless tobacco: Never   Vaping Use    Vaping status: Never Used   Substance Use Topics    Alcohol use: No    Drug use: No        FAMILY HISTORY  Family History   Problem Relation Age of Onset    Alcohol/Drug Father     Depression Mother         medicated    Cancer Paternal Grandfather         stomach    Family History Negative Sister        REVIEW OF SYSTEMS  Complete 12 system Review of Systems performed and was negative except for HPI.    VITALS  Estimated body mass index is 29.27 kg/m  as calculated from the following:    Height as of 4/9/25: 1.778 m (5' 10\").    Weight as of 4/9/25: 92.5 kg (204 lb).    PHYSICAL EXAMINATION   General: Age appropriate appearing, no acute distress  HEENT: normocephalic, atraumatic, sclera non-icteric  Skin: No open skin lesion noted in visible areas.  Respiratory: Non labored breathing, No wheezes.  Cardiac/Vessels: No edema, cyanosis, " "clubbing noted in all extremities.  Lymph: No palpable lymph node swelling noted around the affected area.  Mental: There was no signs of aberrant behaviors noted. Patient was pleasant throughout the encounter.    Functional Movements: Right walking boot in place    ANKLE/FOOT:   Inspection: No major right lateral ankle swelling  Palpation: No more TTP right lateral malleolus    IMAGING STUDIES:  5/23/2025 Right ankle x-ray: \"Early healing changes of the minimally displaced (2 mm) transverse oblique fracture through the distal fibula at the level of the tibiotalar joint line, with small amount of ossified callus. No additional fracture. Normal joint alignment. The   ankle mortise is symmetric. Moderate tibiotalar joint effusion. Otherwise negative.\"    5/9/2025 Right ankle x-ray: \" Minimally displaced oblique fracture of the distal fibula at the level of the metaphysis. Relative to the prior examination, there is minimal new fracture displacement (2 mm). Small amount of immature callus along the fracture lines, but no   solid bridging bone yet visualized. No other new or significant bone or joint abnormality. Joint alignment remains normal. The ankle mortise remains symmetric. Persistent soft tissue swelling in the ankle.\"    4/9/2025 Right ankle x-ray: \"Acute oblique nondisplaced fracture of the lateral malleolus with moderate overlying soft tissue swelling. No additional fractures. The ankle mortise is intact. \"    I personally reviewed these images and agree with the radiology report and shared the findings with the patient.    IMPRESSION  Sonny Deluca is a very pleasant 44 year old male for follow-up evaluation of right lateral ankle pain after falling down stairs rolling his ankle on 4/8/2025 sustaining a nondisplaced lateral malleolus fracture supported by clinical and radiographic assessment. He was placed in a walking fracture boot initially for 4 weeks but did endorse poor compliance with consistently " wearing the boot during the day and radiographic assessment unfortunately revealed new 2mm displacement.  He was placed in a cast for 2 weeks now, and advised that immobilization typically takes 4-8 weeks.    PLAN  The following was discussed with the patient:  Activity Modification: continue Immobilization in a cast.  Imaging/Tests: updated Ankle radiographs reviewed as noted above  Rehabilitation: None at this time.  Ankle ROM exercises, calf stretching and strengthening after immobilization to regain full dorsiflexion and peroneal muscle strength.  Orthotics/Bracing: Cast replaced at today's visit.  See application note below for further details.  Medication: Tylenol or Ibuprofen as needed  Follow-up Plan: 2 weeks    Procedures  They were encouraged to message me on [employer] whenever they needed.    The patient was in agreement with this plan. All questions were answered to the best of my ability.    Total time (face-to-face and non-face-to-face) spent on today's visit was 55 minutes. This included preparation for the visit (i.e. reviewing test results), performance of a medically appropriate history and examination, placing orders for medications, tests or other procedures, and discussing the plan of care with the patient. This time is exclusive of procedures performed and time spent teaching.      Josué Forrester MD, [facility]  Sports Medicine Attending Physician  Department of Physical Medicine & Rehabilitation

## 2025-06-24 ENCOUNTER — THERAPY VISIT (OUTPATIENT)
Dept: PHYSICAL THERAPY | Facility: CLINIC | Age: 45
End: 2025-06-24
Attending: STUDENT IN AN ORGANIZED HEALTH CARE EDUCATION/TRAINING PROGRAM
Payer: COMMERCIAL

## 2025-06-24 DIAGNOSIS — M25.572 PAIN IN JOINT, ANKLE AND FOOT, LEFT: Primary | ICD-10-CM

## 2025-06-24 DIAGNOSIS — S82.61XD DISPLACED FRACTURE OF LATERAL MALLEOLUS OF RIGHT FIBULA, SUBSEQUENT ENCOUNTER FOR CLOSED FRACTURE WITH ROUTINE HEALING: ICD-10-CM

## 2025-06-24 PROCEDURE — 97530 THERAPEUTIC ACTIVITIES: CPT | Mod: GP

## 2025-06-24 PROCEDURE — 97161 PT EVAL LOW COMPLEX 20 MIN: CPT | Mod: GP

## 2025-06-24 PROCEDURE — 97110 THERAPEUTIC EXERCISES: CPT | Mod: GP

## 2025-06-24 ASSESSMENT — ACTIVITIES OF DAILY LIVING (ADL)
LEFS_SCORE(%): INCOMPLETE
PLEASE_INDICATE_YOR_PRIMARY_REASON_FOR_REFERRAL_TO_THERAPY:: FOOT AND/OR ANKLE
ANY_OF_YOUR_USUAL_WORK,_HOUSEWORK_OR_SCHOOL_ACTIVITIES: A LITTLE BIT OF DIFFICULTY
LEFS_RAW_SCORE: INCOMPLETE

## 2025-06-24 NOTE — PROGRESS NOTES
PHYSICAL THERAPY EVALUATION  Type of Visit: Evaluation       Fall Risk Screen:  Have you fallen 2 or more times in the past year?: No  Have you fallen and had an injury in the past year?: No    Subjective         Pt is a 45 year old male presenting to PT with chief complaint of right ankle pain, consistent with referring diagnosis of Displaced fracture of lateral malleolus of right fibula, subsequent encounter for closed fracture with routine healing. THOMAS/DOI: falling down stairs rolling his ankle on 4/8/2025.     Timeframe:   He fracture his ankle on 4/8/25  He was in a CAM boot for 4 weeks.   Then was cast for 4 weeks.   Cast was removed on 6/6/25.   He then was in a tri-lock brace and quit wearing it ~1 week ago.     Since being out of the brace he has had increasing pain. He notes increased pain with walking, stairs. He has used ibuprofen as needed for pain. His pain does not wake him up at night.         Presenting condition or subjective complaint: broken ankle  Date of onset: 06/06/25    Relevant medical history:     Past Medical History:   Diagnosis Date    NO ACTIVE PROBLEMS        Dates & types of surgery:    Past Surgical History:   Procedure Laterality Date    IR SINOGRAM INJECTION DIAGNOSTIC  4/4/2023    IR SINOGRAM INJECTION DIAGNOSTIC  4/11/2023    IR SINOGRAM INJECTION DIAGNOSTIC  4/21/2023    IR SINOGRAM INJECTION THERAPEUTIC  3/7/2023    IR SINOGRAM INJECTION THERAPEUTIC  3/14/2023    IR SINOGRAM INJECTION THERAPEUTIC  3/21/2023    IR SKIN SUBQ/SEROMA ABSCESS DRAIN  3/1/2023    NO HISTORY OF SURGERY      SPLENECTOMY, ROBOT-ASSISTED, LAPAROSCOPIC, USING DA ZANA XI N/A 10/22/2024    Procedure: Robotic splenic cyst fenestration;  Surgeon: Jh Centeno MD;  Location: [facility] OR       Prior diagnostic imaging/testing results: X-ray     Prior therapy history for the same diagnosis, illness or injury: No      Prior Level of Function  Transfers: Independent  Ambulation: Independent  ADL: Independent  IADL:  Driving, Finances, Housekeeping, Work    Living Environment  Social support: With a significant other or spouse   Type of home: House   Stairs to enter the home: Yes 4 Is there a railing: Yes     Ramp: No   Stairs inside the home: Yes 20 Is there a railing: Yes     Help at home: None  Equipment owned: Crutches     Employment: Yes   Hobbies/Interests: cycling    Patient goals for therapy: walk without pain    Pain assessment: Location: pain in lateral ankle/Ratin/10 at rest, 2-3/10 with walking     Objective   FOOT/ANKLE EVALUATION  GAIT:   Weightbearing Status: WBAT  Gait Deviations: Pt ambulates with antalgic gait pattern ,guarding R LE.     EDEMA: mild edema in lateral aspect of right ankle    ROM:   Ankle Dorsiflexion: 10  Ankle Plantarflexion: 35  Ankle Inversion: 40  Ankle Eversion: 15    STRENGTH:   Ankle Dorsiflexion: WNL  Ankle Plantarflexion: WNL  Ankle Inversion: WNL  Ankle Eversion: painful and 50% strength    SPECIAL TESTS:   Deferred due to healing state of fracture    PALPATION: pain along peroneal group, pain at lateal malleolus    JOINT MOBILITY:   Ankle Joint Mobility:  Deferred due to healing state of fracture     Mid Foot Mobility:  Deferred due to healing state of fracture    FUNCTIONAL STRENGTH:   Squat: significant weight shift toward L LE at bottom of motion  SL Squat: not assessed due to pain  SLS: requires UE support with R LE  DL HR: B subtalar inversion, offloading of R LE  SL HR: 25% height achieved with R compared to L LE, limited by pain      Assessment & Plan   CLINICAL IMPRESSIONS  Medical Diagnosis: Displaced fracture of lateral malleolus of right fibula, subsequent encounter for closed fracture with routine healing    Treatment Diagnosis: left ankle pain   Impression/Assessment:     Pt is a 45 year old male presenting to PT with chief complaint of right ankle pain, consistent with referring diagnosis of Displaced fracture of lateral malleolus of right fibula, subsequent  encounter for closed fracture with routine healing. THOMAS/DOI: falling down stairs rolling his ankle on 4/8/2025. The patient presents with decreased strength, decreased ROM, decreased stability, gait impairment, and increased pain and edema which inhibits their ability to perform ADLs, walking, and hobbies such as cycling. The patient tolerated the session well.  HEP was initiated. The patient will continue to benefit from skilled PT for improved strength, ROM, stability, gait, and to decrease pain and edema.         Clinical Decision Making (Complexity):  Clinical Presentation: Stable/Uncomplicated  Clinical Presentation Rationale: based on medical and personal factors listed in PT evaluation  Clinical Decision Making (Complexity): Low complexity    PLAN OF CARE  Treatment Interventions:  Modalities: Cupping, Dry Needling, E-stim, Iontophoresis, Ultrasound  Interventions: Gait Training, Manual Therapy, Neuromuscular Re-education, Therapeutic Activity, Therapeutic Exercise, Self-Care/Home Management    Long Term Goals     PT Goal 1  Goal Identifier: (P) ambaultion  Goal Description: (P) pt will be able to walk 3 miles with 0/10 pain  Rationale: (P) to maximize safety and independence with performance of ADLs and functional tasks  Goal Progress: (P) baseline: pt has increased ankle pain with walking 1 block  Target Date: (P) 08/23/25      Frequency of Treatment: 1X per week decreasing to 1X every other week as needed  Duration of Treatment: 2-3 months    Education Assessment:   Learner/Method: Patient;No Barriers to Learning  Education Comments: pt verbalizes understanding of educatoin provided    Risks and benefits of evaluation/treatment have been explained.   Patient/Family/caregiver agrees with Plan of Care.     Evaluation Time:           Signing Clinician: Magdalene Olivas, PT

## 2025-07-13 ENCOUNTER — HEALTH MAINTENANCE LETTER (OUTPATIENT)
Age: 45
End: 2025-07-13

## 2025-08-04 ENCOUNTER — OFFICE VISIT (OUTPATIENT)
Dept: URGENT CARE | Facility: URGENT CARE | Age: 45
End: 2025-08-04
Payer: COMMERCIAL

## 2025-08-04 VITALS
WEIGHT: 203.31 LBS | TEMPERATURE: 100.5 F | SYSTOLIC BLOOD PRESSURE: 113 MMHG | DIASTOLIC BLOOD PRESSURE: 77 MMHG | HEART RATE: 83 BPM | OXYGEN SATURATION: 98 % | BODY MASS INDEX: 29.17 KG/M2 | RESPIRATION RATE: 16 BRPM

## 2025-08-04 DIAGNOSIS — R21 RASH: ICD-10-CM

## 2025-08-04 DIAGNOSIS — S20.361A INSECT BITE OF RIGHT FRONT WALL OF THORAX, INITIAL ENCOUNTER: Primary | ICD-10-CM

## 2025-08-04 DIAGNOSIS — R50.9 FEVER, UNSPECIFIED FEVER CAUSE: ICD-10-CM

## 2025-08-04 DIAGNOSIS — A69.20 ERYTHEMA MIGRANS (LYME DISEASE): ICD-10-CM

## 2025-08-04 DIAGNOSIS — A69.20 LYME DISEASE: ICD-10-CM
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
## 2025-08-04 PROCEDURE — 3078F DIAST BP <80 MM HG: CPT

## 2025-08-04 PROCEDURE — 3074F SYST BP LT 130 MM HG: CPT

## 2025-08-04 PROCEDURE — 99214 OFFICE O/P EST MOD 30 MIN: CPT

## 2025-08-04 RX ORDER — DOXYCYCLINE HYCLATE 100 MG
100 TABLET ORAL 2 TIMES DAILY
Qty: 20 TABLET | Refills: 0 | Status: SHIPPED | OUTPATIENT
Start: 2025-08-04 | End: 2025-08-14

## (undated) DEVICE — CLEANER INST PRE-KLENZ SOAK SHIELD TUBE 6 ML MEDIUM 2D66J4

## (undated) DEVICE — SYR 10ML FINGER CONTROL W/O NDL 309695

## (undated) DEVICE — DEVICE SUTURE PASSER 14GA WECK EFX EFXSP2

## (undated) DEVICE — DAVINCI XI HANDPIECE ESU VESSEL SEALER 8MM EXT 480422

## (undated) DEVICE — DAVINCI HOT SHEARS TIP COVER  400180

## (undated) DEVICE — SU MONOCRYL 4-0 PS-2 18" UND Y496G

## (undated) DEVICE — DAVINCI XI SUCTION IRRIGATOR ENDOWRIST 480299

## (undated) DEVICE — DRAPE SHEET REV FOLD 3/4 9349

## (undated) DEVICE — DAVINCI XI SEAL UNIVERSAL 5-12MM 470500

## (undated) DEVICE — SUCTION CANISTER MEDIVAC LINER 3000ML W/LID 65651-530

## (undated) DEVICE — PACK LAP CHOLE SLC15LCFSD

## (undated) DEVICE — POUCH TISSUE RETRIEVAL ROBOTIC 8MM 5.1" INTRO TRS-ROBO-8

## (undated) DEVICE — SU VICRYL+ 0 27IN CT-2 VLT VCP334H

## (undated) DEVICE — GLOVE BIOGEL PI MICRO SZ 8.0 48580

## (undated) DEVICE — SOL WATER IRRIG 1000ML BOTTLE 2F7114

## (undated) DEVICE — ANTIFOG SOLUTION SEE SHARP 150M TROCAR SWABS 30978 (COI)

## (undated) DEVICE — DAVINCI XI OBTURATOR BLADELESS 8MM 470359

## (undated) DEVICE — EVAC SYSTEM CLEAR FLOW SC082500

## (undated) DEVICE — DRAPE BREAST/CHEST 29420

## (undated) DEVICE — LINEN TOWEL PACK X5 5464

## (undated) DEVICE — DAVINCI XI DRAPE COLUMN 470341

## (undated) DEVICE — ESU GROUND PAD UNIVERSAL W/O CORD

## (undated) DEVICE — GOWN IMPERVIOUS SPECIALTY XL/XLONG 39049

## (undated) DEVICE — PREP CHLORAPREP 26ML TINTED HI-LITE ORANGE 930815

## (undated) DEVICE — GLOVE BIOGEL PI MICRO INDICATOR UNDERGLOVE SZ 8.0 48980

## (undated) DEVICE — SU VICRYL 0 CT-2 27" J334H

## (undated) DEVICE — DRSG BANDAID 1X3" FABRIC CURITY LATEX FREE KC44101

## (undated) DEVICE — DAVINCI XI DRAPE ARM 470015

## (undated) DEVICE — LIGHT HANDLE X2

## (undated) RX ORDER — LIDOCAINE HYDROCHLORIDE 10 MG/ML
INJECTION, SOLUTION INFILTRATION; PERINEURAL
Status: DISPENSED
Start: 2023-03-21

## (undated) RX ORDER — PROPOFOL 10 MG/ML
INJECTION, EMULSION INTRAVENOUS
Status: DISPENSED
Start: 2024-10-22

## (undated) RX ORDER — LIDOCAINE HYDROCHLORIDE 10 MG/ML
INJECTION, SOLUTION INFILTRATION; PERINEURAL
Status: DISPENSED
Start: 2023-03-01

## (undated) RX ORDER — DEXAMETHASONE SODIUM PHOSPHATE 4 MG/ML
INJECTION, SOLUTION INTRA-ARTICULAR; INTRALESIONAL; INTRAMUSCULAR; INTRAVENOUS; SOFT TISSUE
Status: DISPENSED
Start: 2024-10-22

## (undated) RX ORDER — ONDANSETRON 2 MG/ML
INJECTION INTRAMUSCULAR; INTRAVENOUS
Status: DISPENSED
Start: 2024-10-22

## (undated) RX ORDER — CEFAZOLIN SODIUM 2 G/100ML
INJECTION, SOLUTION INTRAVENOUS
Status: DISPENSED
Start: 2023-03-01

## (undated) RX ORDER — HYDROMORPHONE HYDROCHLORIDE 1 MG/ML
INJECTION, SOLUTION INTRAMUSCULAR; INTRAVENOUS; SUBCUTANEOUS
Status: DISPENSED
Start: 2024-10-22

## (undated) RX ORDER — BUPIVACAINE HYDROCHLORIDE AND EPINEPHRINE 2.5; 5 MG/ML; UG/ML
INJECTION, SOLUTION EPIDURAL; INFILTRATION; INTRACAUDAL; PERINEURAL
Status: DISPENSED
Start: 2024-10-22

## (undated) RX ORDER — LIDOCAINE HYDROCHLORIDE 10 MG/ML
INJECTION, SOLUTION INFILTRATION; PERINEURAL
Status: DISPENSED
Start: 2023-03-14

## (undated) RX ORDER — VECURONIUM BROMIDE 1 MG/ML
INJECTION, POWDER, LYOPHILIZED, FOR SOLUTION INTRAVENOUS
Status: DISPENSED
Start: 2024-10-22

## (undated) RX ORDER — FENTANYL CITRATE 50 UG/ML
INJECTION, SOLUTION INTRAMUSCULAR; INTRAVENOUS
Status: DISPENSED
Start: 2024-10-22

## (undated) RX ORDER — FENTANYL CITRATE 50 UG/ML
INJECTION, SOLUTION INTRAMUSCULAR; INTRAVENOUS
Status: DISPENSED
Start: 2023-03-01